# Patient Record
Sex: FEMALE | Race: WHITE | NOT HISPANIC OR LATINO | Employment: UNEMPLOYED | URBAN - METROPOLITAN AREA
[De-identification: names, ages, dates, MRNs, and addresses within clinical notes are randomized per-mention and may not be internally consistent; named-entity substitution may affect disease eponyms.]

---

## 2023-01-23 ENCOUNTER — OFFICE VISIT (OUTPATIENT)
Dept: FAMILY MEDICINE CLINIC | Facility: CLINIC | Age: 58
End: 2023-01-23

## 2023-01-23 VITALS
OXYGEN SATURATION: 99 % | HEIGHT: 62 IN | HEART RATE: 74 BPM | WEIGHT: 172.4 LBS | RESPIRATION RATE: 16 BRPM | SYSTOLIC BLOOD PRESSURE: 126 MMHG | TEMPERATURE: 97.3 F | BODY MASS INDEX: 31.73 KG/M2 | DIASTOLIC BLOOD PRESSURE: 80 MMHG

## 2023-01-23 DIAGNOSIS — M79.644 THUMB PAIN, RIGHT: Primary | ICD-10-CM

## 2023-01-23 DIAGNOSIS — Z12.31 ENCOUNTER FOR SCREENING MAMMOGRAM FOR BREAST CANCER: ICD-10-CM

## 2023-01-23 DIAGNOSIS — I10 HYPERTENSION, UNSPECIFIED TYPE: ICD-10-CM

## 2023-01-23 DIAGNOSIS — K21.9 GASTROESOPHAGEAL REFLUX DISEASE WITHOUT ESOPHAGITIS: ICD-10-CM

## 2023-01-23 DIAGNOSIS — Z12.4 SCREENING FOR CERVICAL CANCER: ICD-10-CM

## 2023-01-23 DIAGNOSIS — E03.9 HYPOTHYROIDISM, UNSPECIFIED TYPE: ICD-10-CM

## 2023-01-23 DIAGNOSIS — Z13.220 SCREENING FOR HYPERLIPIDEMIA: ICD-10-CM

## 2023-01-23 DIAGNOSIS — E66.9 OBESITY (BMI 30-39.9): ICD-10-CM

## 2023-01-23 RX ORDER — LYSINE 500 MG
TABLET ORAL DAILY
COMMUNITY

## 2023-01-23 RX ORDER — LEVOTHYROXINE SODIUM 112 MCG
112 TABLET ORAL DAILY
COMMUNITY
Start: 2022-12-12

## 2023-01-23 RX ORDER — FLUTICASONE PROPIONATE 50 MCG
1 SPRAY, SUSPENSION (ML) NASAL
COMMUNITY

## 2023-01-23 RX ORDER — LISINOPRIL 5 MG/1
2.5 TABLET ORAL
COMMUNITY
Start: 2023-01-11

## 2023-01-23 RX ORDER — ALPRAZOLAM 0.25 MG/1
TABLET ORAL
COMMUNITY

## 2023-01-23 RX ORDER — ECHINACEA PURPUREA EXTRACT 125 MG
1 TABLET ORAL
COMMUNITY
End: 2023-01-23

## 2023-01-23 NOTE — PROGRESS NOTES
Name: Bere Harris      : 1965      MRN: [id number]  Encounter Provider: Celina Shaffer DO  Encounter Date: 2023   Encounter department: [address] [place] [address] PRIMARY CARE  Chief Complaint   Patient presents with   • New Patient Visit     New patient being established pt is new to [facility],no previous records available  Pt states can't bend her right thumb for the past 3 mo  Pt is also having GI problems  Patient Instructions   Here for establishing care  Rec eating healthy and exercising  Recent stress test was wnl  Patient with right thumb pain and decreased ROM and will rec xray right hand  Rec updating labs for HTN and hypothyroidism  Rec new GYN as she moved to the area and is from [address] [place] [address] and is looking to establish with new doctors in area  Rec also to take BP med and recheck in 6 months for thyroid  Rec GI for gerd symptoms and may use prilosec or pepcid otc prn gerd  Assessment & Plan     1  Thumb pain, right  -     XR hand 3+ vw right; Future; Expected date: 2023  -     Ambulatory Referral to Orthopedic Surgery; Future    2  Hypothyroidism, unspecified type  -     TSH, 3rd generation; Future  -     T4, free    3  Hypertension, unspecified type  -     Comprehensive metabolic panel; Future    4  Gastroesophageal reflux disease without esophagitis  -     CBC and differential; Future  -     Ambulatory Referral to Gastroenterology; Future    5  Obesity (BMI 30-39 9)    6  Screening for cervical cancer  -     Ambulatory referral to Obstetrics / Gynecology; Future    7  Encounter for screening mammogram for breast cancer  -     Mammo screening bilateral w 3d & cad; Future; Expected date: 2023    8  Screening for hyperlipidemia  -     Comprehensive metabolic panel; Future  -     Lipid Panel with Direct LDL reflex; Future           Subjective      New Patient Visit (New patient being established pt is new to [facility],no previous records available   Pt states can't bend her right thumb for the past 3 mo  Pt is also having GI problems  ) Colon screening age 47 and had a hx of osteopenia and is on vitamin D for osteopenia and also has hx of HTN and thyroid d/o  Had covid 2 weeks ago  Covid Vaccinated  Patient does exercise and does not eat healthy  Mammograms are all UTD  Patient sees GYN in [place]  Patient has right thumb issues  No trauma to right thumb and has swelling in right thumb  Takes prilosec for gerd and would like to see GI  Colon screening is UTD  Retired teacher [employer]  Review of Systems   Constitutional: Negative  HENT: Negative  Eyes: Negative  Respiratory: Negative  Cardiovascular: Negative  Gastrointestinal:        Gevena Jamilah    Endocrine: Negative  Genitourinary: Negative  Musculoskeletal: Negative  Skin: Negative  Allergic/Immunologic: Negative  Neurological: Negative  Hematological: Negative  Psychiatric/Behavioral: Negative          Current Outpatient Medications on File Prior to Visit   Medication Sig   • ALPRAZolam (XANAX) 0 25 mg tablet Take by mouth daily at bedtime as needed for anxiety   • Calcium-Vitamins C & D (CALCIUM/C/D PO) Take by mouth   • Cholecalciferol 50 MCG (2000 UT) CAPS Take by mouth   • fluticasone (FLONASE) 50 mcg/act nasal spray 1 spray into each nostril   • L-Lysine 500 MG TABS Take by mouth daily   • lisinopril (ZESTRIL) 5 mg tablet 2 5 mg   • Multiple Vitamins-Minerals (MULTIVITAL PO) Take by mouth   • Probiotic Product (PROBIOTIC-10 PO) Take by mouth   • Synthroid 112 MCG tablet Take 112 mcg by mouth daily   • [DISCONTINUED] sodium chloride (OCEAN) 0 65 % nasal spray 1 spray into each nostril (Patient not taking: Reported on 1/23/2023)       Objective     /80 (BP Location: Left arm, Patient Position: Sitting, Cuff Size: Adult)   Pulse 74   Temp (!) 97 3 °F (36 3 °C) (Temporal)   Resp 16   Ht 5' 1 9" (1 572 m)   Wt 78 2 kg (172 lb 6 4 oz)   SpO2 99%   BMI 31 63 kg/m²     Physical Exam  Constitutional:       Appearance: She is well-developed  She is obese  HENT:      Head: Normocephalic and atraumatic  Right Ear: External ear normal       Left Ear: External ear normal       Nose: Nose normal    Eyes:      Conjunctiva/sclera: Conjunctivae normal       Pupils: Pupils are equal, round, and reactive to light  Cardiovascular:      Rate and Rhythm: Normal rate and regular rhythm  Pulses: Normal pulses  Heart sounds: Normal heart sounds  Pulmonary:      Effort: Pulmonary effort is normal       Breath sounds: Normal breath sounds  Abdominal:      General: Abdomen is flat  Bowel sounds are normal       Palpations: Abdomen is soft  Musculoskeletal:         General: Normal range of motion  Cervical back: Normal range of motion and neck supple  Skin:     General: Skin is warm and dry  Capillary Refill: Capillary refill takes less than 2 seconds  Neurological:      General: No focal deficit present  Mental Status: She is alert and oriented to person, place, and time  Deep Tendon Reflexes: Reflexes are normal and symmetric  Psychiatric:         Mood and Affect: Mood normal          Behavior: Behavior normal          Thought Content:  Thought content normal          Judgment: Judgment normal        Matt Pool DO

## 2023-01-23 NOTE — PATIENT INSTRUCTIONS
Here for establishing care  Rec eating healthy and exercising  Recent stress test was wnl  Patient with right thumb pain and decreased ROM and will rec xray right hand  Rec updating labs for HTN and hypothyroidism  Rec new GYN as she moved to the area and is from [place] and is looking to establish with new doctors in area  Rec also to take BP med and recheck in 6 months for thyroid  Rec GI for gerd symptoms and may use prilosec or pepcid otc prn gerd

## 2023-02-20 ENCOUNTER — OFFICE VISIT (OUTPATIENT)
Dept: FAMILY MEDICINE CLINIC | Facility: CLINIC | Age: 58
End: 2023-02-20

## 2023-02-20 VITALS
HEIGHT: 63 IN | TEMPERATURE: 96 F | WEIGHT: 171 LBS | BODY MASS INDEX: 30.3 KG/M2 | SYSTOLIC BLOOD PRESSURE: 124 MMHG | DIASTOLIC BLOOD PRESSURE: 86 MMHG | HEART RATE: 87 BPM | OXYGEN SATURATION: 99 %

## 2023-02-20 DIAGNOSIS — I10 HYPERTENSION, UNSPECIFIED TYPE: ICD-10-CM

## 2023-02-20 DIAGNOSIS — R30.0 DYSURIA: ICD-10-CM

## 2023-02-20 DIAGNOSIS — B37.31 VAGINAL CANDIDIASIS: ICD-10-CM

## 2023-02-20 DIAGNOSIS — J02.9 SORE THROAT: Primary | ICD-10-CM

## 2023-02-20 LAB
SL AMB  POCT GLUCOSE, UA: ABNORMAL
SL AMB LEUKOCYTE ESTERASE,UA: ABNORMAL
SL AMB POCT BILIRUBIN,UA: ABNORMAL
SL AMB POCT BLOOD,UA: ABNORMAL
SL AMB POCT CLARITY,UA: CLEAR
SL AMB POCT COLOR,UA: YELLOW
SL AMB POCT KETONES,UA: ABNORMAL
SL AMB POCT NITRITE,UA: ABNORMAL
SL AMB POCT PH,UA: 5
SL AMB POCT SPECIFIC GRAVITY,UA: 1
SL AMB POCT URINE PROTEIN: ABNORMAL
SL AMB POCT UROBILINOGEN: ABNORMAL

## 2023-02-20 RX ORDER — FLUCONAZOLE 150 MG/1
150 TABLET ORAL ONCE
Qty: 1 TABLET | Refills: 0 | Status: SHIPPED | OUTPATIENT
Start: 2023-02-20 | End: 2023-02-20

## 2023-02-20 RX ORDER — AZITHROMYCIN 250 MG/1
TABLET, FILM COATED ORAL
Qty: 6 TABLET | Refills: 0 | Status: SHIPPED | OUTPATIENT
Start: 2023-02-20 | End: 2023-02-25

## 2023-02-20 NOTE — PROGRESS NOTES
Name: Cherelle Neville      : 1965      MRN: [id number]  Encounter Provider: Lanny Cotton DO  Encounter Date: 2023   Encounter department: [address] [place] [address]  Chief Complaint   Patient presents with   • Cold Like Symptoms     Possible strep  Sore throat, white spots in back of throat, low grade temp  Burning when urinating  symptoms started Jus night   Neg home covid      Patient Instructions   Here for sore throat and dysuria, await UA c&s and take Diflucan if needed for vaginal candidiasis  Stay well hydrated and change toothbrush as directed  Assessment & Plan     1  Sore throat  -     azithromycin (Zithromax) 250 mg tablet; Take 2 tablets (500 mg total) by mouth daily for 1 day, THEN 1 tablet (250 mg total) daily for 4 days  2  Dysuria  -     POCT urine dip  -     azithromycin (Zithromax) 250 mg tablet; Take 2 tablets (500 mg total) by mouth daily for 1 day, THEN 1 tablet (250 mg total) daily for 4 days  3  Hypertension, unspecified type    4  Vaginal candidiasis  -     fluconazole (DIFLUCAN) 150 mg tablet; Take 1 tablet (150 mg total) by mouth once for 1 dose           Subjective      Cold Like Symptoms (Possible strep  Sore throat, white spots in back of throat, low grade temp  Burning when urinating  symptoms started Jus night   Neg home covid )      Covid test negative this am      Review of Systems   Constitutional: Negative  HENT: Positive for sore throat  Eyes: Negative  Respiratory: Negative  Cardiovascular: Negative  Gastrointestinal: Negative  Endocrine: Negative  Genitourinary: Positive for dysuria  Musculoskeletal: Negative  Skin: Negative  Allergic/Immunologic: Negative  Neurological: Negative  Hematological: Negative  Psychiatric/Behavioral: Negative          Current Outpatient Medications on File Prior to Visit   Medication Sig   • ALPRAZolam (XANAX) 0 25 mg tablet Take by mouth daily at bedtime as needed for anxiety   • Calcium-Vitamins C & D (CALCIUM/C/D PO) Take by mouth   • Cholecalciferol 50 MCG (2000 UT) CAPS Take by mouth   • fluticasone (FLONASE) 50 mcg/act nasal spray 1 spray into each nostril   • L-Lysine 500 MG TABS Take by mouth daily   • lisinopril (ZESTRIL) 5 mg tablet 2 5 mg   • Multiple Vitamins-Minerals (MULTIVITAL PO) Take by mouth   • Probiotic Product (PROBIOTIC-10 PO) Take by mouth   • Synthroid 112 MCG tablet Take 112 mcg by mouth daily       Objective     /86 (BP Location: Left arm, Patient Position: Sitting, Cuff Size: Large)   Pulse 87   Temp (!) 96 °F (35 6 °C) (Temporal)   Ht 5' 2 5" (1 588 m)   Wt 77 6 kg (171 lb)   LMP  (Exact Date)   SpO2 99%   BMI 30 78 kg/m²     Physical Exam  Constitutional:       Appearance: Normal appearance  She is well-developed  HENT:      Head: Normocephalic and atraumatic  Right Ear: Tympanic membrane, ear canal and external ear normal       Left Ear: Tympanic membrane, ear canal and external ear normal       Nose: Nose normal       Mouth/Throat:      Mouth: Mucous membranes are moist       Comments: Hurts to swallow and has white spots in back of throat  Eyes:      Conjunctiva/sclera: Conjunctivae normal       Pupils: Pupils are equal, round, and reactive to light  Cardiovascular:      Rate and Rhythm: Normal rate and regular rhythm  Pulses: Normal pulses  Heart sounds: Normal heart sounds  Pulmonary:      Effort: Pulmonary effort is normal       Breath sounds: Normal breath sounds  Musculoskeletal:         General: Normal range of motion  Cervical back: Normal range of motion and neck supple  Skin:     General: Skin is warm and dry  Neurological:      Mental Status: She is alert and oriented to person, place, and time  Deep Tendon Reflexes: Reflexes are normal and symmetric     Psychiatric:         Behavior: Behavior normal        Lonzie Roles, DO

## 2023-02-22 LAB — BACTERIA UR CULT: NORMAL

## 2023-03-24 ENCOUNTER — OFFICE VISIT (OUTPATIENT)
Dept: FAMILY MEDICINE CLINIC | Facility: CLINIC | Age: 58
End: 2023-03-24

## 2023-03-24 VITALS
BODY MASS INDEX: 31.47 KG/M2 | HEART RATE: 100 BPM | TEMPERATURE: 96.2 F | WEIGHT: 171 LBS | DIASTOLIC BLOOD PRESSURE: 68 MMHG | OXYGEN SATURATION: 99 % | SYSTOLIC BLOOD PRESSURE: 108 MMHG | HEIGHT: 62 IN

## 2023-03-24 DIAGNOSIS — G57.62 MORTON'S NEUROMA OF LEFT FOOT: ICD-10-CM

## 2023-03-24 DIAGNOSIS — M54.41 CHRONIC MIDLINE LOW BACK PAIN WITH RIGHT-SIDED SCIATICA: Primary | ICD-10-CM

## 2023-03-24 DIAGNOSIS — G89.29 CHRONIC MIDLINE LOW BACK PAIN WITH RIGHT-SIDED SCIATICA: Primary | ICD-10-CM

## 2023-03-24 RX ORDER — METHOCARBAMOL 500 MG/1
500 TABLET, FILM COATED ORAL 3 TIMES DAILY
Qty: 15 TABLET | Refills: 0 | Status: SHIPPED | OUTPATIENT
Start: 2023-03-24

## 2023-03-24 RX ORDER — OXYCODONE HYDROCHLORIDE 5 MG/1
TABLET ORAL
COMMUNITY
Start: 2023-02-24 | End: 2023-03-24

## 2023-03-24 RX ORDER — METHYLPREDNISOLONE 4 MG/1
TABLET ORAL
Qty: 21 EACH | Refills: 0 | Status: SHIPPED | OUTPATIENT
Start: 2023-03-24

## 2023-03-24 NOTE — PATIENT INSTRUCTIONS
Start steroid, this is the Medrol  This is 24 mg on day 1, then decrease by one tablet (4 mg) each day until course completed  Take this with food as it may upset your stomach  It's best to take it earlier in the day otherwise it may keep you up at night  Do not take this with NSAIDs such as advil/ibuprofen/advil/aleve/motrin  Start robaxin, this is one pill three times a day as needed  This medication may cause drowsiness  Do not drive on this medication  Do not drink alcohol while taking this medication  Do not mix with other medications that may cause drowsiness  Referral to comprehensive spine program given  Please call the office if you are experiencing any worsening of symptoms or no symptom improvement  Dermatology Practices     [facility]   Dr Josh Mckeon MD and Nancie Connors Office: [address] [place] Office: [phone]  [facility]   Multiple locations and providers  [phone]  [facility]   Multiple Providers and Locations  [address]   [phone]  [facility]   [address] [place], [address]  You can also call the back of your insurance card and ask them for covered providers

## 2023-03-24 NOTE — PROGRESS NOTES
Name: Samuel Bradley      : 1965      MRN: [id number]  Encounter Provider: AFUA Monaco  Encounter Date: 3/24/2023   Encounter department: [address] [place] [address] Road     1  Chronic midline low back pain with right-sided sciatica  -     Ambulatory Referral to Comprehensive Spine Program; Future  -     methylPREDNISolone 4 MG tablet therapy pack; Use as directed on package  -     methocarbamol (ROBAXIN) 500 mg tablet; Take 1 tablet (500 mg total) by mouth 3 (three) times a day    2  Rust's neuroma of left foot  -     Ambulatory Referral to Podiatry; Future    Discussed differentials and treatment options with patient at length  Advised patient that anti-inflammatories and muscle relaxant as well as physical therapy are likely for steps  Imaging typically not done unless traumatic injury or cannot tolerate physical therapy/neurological symptoms  Current agreed-upon plan would be starting Robaxin the muscle relaxant 3 times a day as needed  Patient advised that this could cause drowsiness  Discussed use of Advil versus trying a different steroid  Patient prefers to try different steroid  Did get joint pain from prednisone and she will monitor for this closely with Medrol  Medrol Dosepak sent to pharmacy for her to start  Can use Tylenol if needed  Referral provided to comprehensive spine program for evaluation where they will guide her to the next best step  Right leg pain may be secondary to chronic ongoing back pain/ compensation  Handout given on low back exercises for her to start  Advised to avoid any heavy lifting pushing pulling/twisting  Information on dermatology practices in the area given to patient and referral to podiatry provided as well  All questions answered  Patient to call if any worsening symptoms or no improvement  Subjective      Patient presents with back pain and leg pain    Patient states this started a few months ago where she did pull something in her lower back during yoga  Since then she has been trying to manage the symptoms at home  She has tried over-the-counter medications such as Advil which does help but this does aggravate her stomach as well  She has been seeing a chiropractor without any relief  She is a massage therapy as well  She states the pain is present in her very low back  She does get pain into her right leg as well  She states this leg is more of a tightness soreness feeling  Sometimes the pain is behind her right knee  No swelling redness or warmth that she has noticed on her legs or back  She states that after sitting for long period of time she has pain and trouble putting full weight on it  There is no traumatic injury or event that caused this  He is also requesting information for a podiatrist as she needs new shoe inserts/orthotics which ultimately help with her back pain and history of Rust's neuroma  Also requesting dermatology pronation  Review of Systems   Constitutional: Negative for chills and fever  Eyes: Negative for discharge  Respiratory: Negative for shortness of breath  Cardiovascular: Negative for chest pain  Gastrointestinal: Negative for constipation and diarrhea  Genitourinary: Negative for difficulty urinating  Musculoskeletal: Positive for back pain  Negative for joint swelling  Skin: Negative for rash  Neurological: Negative for headaches  Hematological: Negative for adenopathy  Psychiatric/Behavioral: The patient is not nervous/anxious          Current Outpatient Medications on File Prior to Visit   Medication Sig   • ALPRAZolam (XANAX) 0 25 mg tablet Take by mouth daily at bedtime as needed for anxiety   • Calcium-Vitamins C & D (CALCIUM/C/D PO) Take by mouth   • Cholecalciferol 50 MCG (2000 UT) CAPS Take by mouth   • fluticasone (FLONASE) 50 mcg/act nasal spray 1 spray into each nostril   • L-Lysine 500 MG TABS Take by mouth daily • lisinopril (ZESTRIL) 5 mg tablet 2 5 mg   • Multiple Vitamins-Minerals (MULTIVITAL PO) Take by mouth   • Probiotic Product (PROBIOTIC-10 PO) Take by mouth   • Synthroid 112 MCG tablet Take 112 mcg by mouth daily   • [DISCONTINUED] oxyCODONE (ROXICODONE) 5 immediate release tablet TAKE 1 TABLET BY MOUTH EVERY 4 HOURS AS NEEDED FOR POSTOPERATIVE PAIN MAX 6 TABLET DAILY (Patient not taking: Reported on 3/24/2023)       Objective     /68 (BP Location: Left arm, Patient Position: Sitting, Cuff Size: Large)   Pulse 100   Temp (!) 96 2 °F (35 7 °C) (Temporal)   Ht 5' 2" (1 575 m)   Wt 77 6 kg (171 lb)   SpO2 99%   BMI 31 28 kg/m²     Physical Exam  Vitals and nursing note reviewed  Constitutional:       General: She is not in acute distress  Appearance: She is well-developed  She is obese  She is not diaphoretic  HENT:      Head: Normocephalic and atraumatic  Right Ear: External ear normal       Left Ear: External ear normal    Eyes:      General: Lids are normal          Right eye: No discharge  Left eye: No discharge  Conjunctiva/sclera: Conjunctivae normal    Cardiovascular:      Rate and Rhythm: Normal rate and regular rhythm  Heart sounds: No murmur heard  Pulmonary:      Effort: Pulmonary effort is normal  No respiratory distress  Breath sounds: Normal breath sounds  No wheezing  Musculoskeletal:         General: No deformity  Cervical back: Neck supple  No tenderness or bony tenderness  Thoracic back: No tenderness or bony tenderness  Lumbar back: Tenderness present  No edema, signs of trauma or bony tenderness  Normal range of motion  Negative right straight leg raise test and negative left straight leg raise test       Comments: Pain with prolonged sitting and some twisting movements  Skin:     General: Skin is warm and dry  Neurological:      Mental Status: She is alert and oriented to person, place, and time     Psychiatric: Speech: Speech normal          Behavior: Behavior normal          Thought Content:  Thought content normal          Judgment: Judgment normal        AFUA Foster

## 2023-04-14 LAB — HBA1C MFR BLD HPLC: 5.3 %

## 2023-07-06 NOTE — PATIENT INSTRUCTIONS
Here for sore throat and dysuria, await UA c&s and take Diflucan if needed for vaginal candidiasis  Stay well hydrated and change toothbrush as directed  Strong peripheral pulses

## 2023-07-10 ENCOUNTER — APPOINTMENT (OUTPATIENT)
Dept: RADIOLOGY | Facility: CLINIC | Age: 58
End: 2023-07-10
Payer: COMMERCIAL

## 2023-07-10 DIAGNOSIS — M25.561 ARTHRALGIA OF RIGHT LOWER LEG: ICD-10-CM

## 2023-07-10 PROCEDURE — 73562 X-RAY EXAM OF KNEE 3: CPT

## 2023-07-11 ENCOUNTER — HOSPITAL ENCOUNTER (OUTPATIENT)
Dept: NON INVASIVE DIAGNOSTICS | Facility: HOSPITAL | Age: 58
Discharge: HOME | End: 2023-07-11
Payer: COMMERCIAL

## 2023-07-11 DIAGNOSIS — M79.604 PAIN IN RIGHT LEG: ICD-10-CM

## 2023-07-11 PROCEDURE — 93971 EXTREMITY STUDY: CPT

## 2023-07-11 PROCEDURE — 93971 EXTREMITY STUDY: CPT | Performed by: SURGERY

## 2023-07-19 ENCOUNTER — TELEPHONE (OUTPATIENT)
Dept: ADMINISTRATIVE | Facility: OTHER | Age: 58
End: 2023-07-19

## 2023-07-19 ENCOUNTER — OFFICE VISIT (OUTPATIENT)
Dept: FAMILY MEDICINE CLINIC | Facility: CLINIC | Age: 58
End: 2023-07-19
Payer: COMMERCIAL

## 2023-07-19 VITALS
SYSTOLIC BLOOD PRESSURE: 132 MMHG | HEART RATE: 91 BPM | OXYGEN SATURATION: 98 % | WEIGHT: 173.6 LBS | HEIGHT: 62 IN | BODY MASS INDEX: 31.94 KG/M2 | DIASTOLIC BLOOD PRESSURE: 73 MMHG | TEMPERATURE: 98.2 F

## 2023-07-19 DIAGNOSIS — E78.2 MIXED HYPERLIPIDEMIA: ICD-10-CM

## 2023-07-19 DIAGNOSIS — M54.50 CHRONIC RIGHT-SIDED LOW BACK PAIN WITHOUT SCIATICA: Primary | ICD-10-CM

## 2023-07-19 DIAGNOSIS — M79.605 LEFT LEG PAIN: ICD-10-CM

## 2023-07-19 DIAGNOSIS — G89.29 CHRONIC RIGHT-SIDED LOW BACK PAIN WITHOUT SCIATICA: Primary | ICD-10-CM

## 2023-07-19 DIAGNOSIS — E03.9 HYPOTHYROIDISM, UNSPECIFIED TYPE: ICD-10-CM

## 2023-07-19 PROCEDURE — 99214 OFFICE O/P EST MOD 30 MIN: CPT | Performed by: FAMILY MEDICINE

## 2023-07-19 RX ORDER — VALACYCLOVIR HYDROCHLORIDE 1 G/1
TABLET, FILM COATED ORAL
COMMUNITY
Start: 2023-04-13

## 2023-07-19 RX ORDER — ESTRADIOL 0.1 MG/G
CREAM VAGINAL
COMMUNITY
Start: 2023-07-03

## 2023-07-19 RX ORDER — LEVOTHYROXINE SODIUM 100 MCG
100 TABLET ORAL DAILY
COMMUNITY
Start: 2023-06-22

## 2023-07-19 RX ORDER — ALPRAZOLAM 0.5 MG/1
TABLET ORAL
COMMUNITY
Start: 2023-04-14

## 2023-07-19 NOTE — TELEPHONE ENCOUNTER
Upon review of the In Basket request we were able to locate, review, and update the patient chart as requested for Mammogram.    Any additional questions or concerns should be emailed to the Practice Liaisons via the appropriate education email address, please do not reply via In Basket.     Thank you  Siobhan Chan

## 2023-07-19 NOTE — TELEPHONE ENCOUNTER
Upon review of the In Basket request and the patient's chart, initial outreach has been made via telephone call to facility. Please see Contacts section for details.      Thank you  Esa Cobian

## 2023-07-19 NOTE — TELEPHONE ENCOUNTER
----- Message from Sandra Sawyer MA sent at 7/19/2023  9:00 AM EDT -----  Regarding: Care Gap Request  07/19/23 9:00 AM    Hello, our patient Thao Ortiz has had Mammogram completed/performed. Please assist in updating the patient chart by pulling the Care Everywhere (CE) document. The date of service is 6/27/22.      Thank you,  Sandra Sawyer  [facility]

## 2023-07-19 NOTE — PROGRESS NOTES
Name: Luis A Agudelo      : 1965      MRN: [id number]  Encounter Provider: Madhu Clayton DO  Encounter Date: 2023   Encounter department: [address] [place] [address]     1. Chronic right-sided low back pain without sciatica  -     Ambulatory referral to Physical Therapy; Future    2. Left leg pain  -     Ambulatory referral to Physical Therapy; Future    3. Hypothyroidism, unspecified type  -     TSH, 3rd generation; Future; Expected date: 2024    4. Mixed hyperlipidemia  -     Comprehensive metabolic panel; Future; Expected date: 2024  -     Lipid Panel with Direct LDL reflex; Future; Expected date: 2024       Reviewed past medical history. Reviewed recent blood work. Lipids elevated but patient would like to try diet for at least 6 months before considering medication. We will recheck in January. Will refer to PT for follow-up on her low back and right leg pain. She does have some recurrent respiratory symptoms which seem to occur [place] outdoors [place] the beach. We will have her use antihistamines preemptively and follow-up if symptoms persist.      Subjective     Patient presents to establish care. We reviewed her chronic medical problems. She is being treated for hypothyroidism and hypertension. She had a right hand surgery several months ago and is healing well. She complains of some low back and right leg pain. She also has some recurrent respiratory symptoms which seem to occur when she is outdoors [place] the sure. She had recent blood work done by her previous primary care physician in [facility]. Review of Systems   Constitutional: Negative. Respiratory: Negative. Cardiovascular: Negative. Gastrointestinal: Negative. Genitourinary: Negative. Musculoskeletal: Negative. Psychiatric/Behavioral: Negative.         Past Medical History:   Diagnosis Date   • Hypothyroidism    • Rosacea      Past Surgical History:   Procedure Laterality Date   • BLADDER SURGERY  2018   • TRIGGER FINGER RELEASE Right 02/27/2023     Family History   Problem Relation Age of Onset   • Hypothyroidism Mother    • Anemia Mother    • Lung cancer Mother    • Hypertension Mother    • Diabetes Brother    • Emphysema Brother    • Pancreatitis Brother      Social History     Socioeconomic History   • Marital status: /Civil Union     Spouse name: None   • Number of children: None   • Years of education: None   • Highest education level: None   Occupational History   • None   Tobacco Use   • Smoking status: Never     Passive exposure: Never   • Smokeless tobacco: Never   Vaping Use   • Vaping Use: Never used   Substance and Sexual Activity   • Alcohol use: Not Currently   • Drug use: Never   • Sexual activity: None   Other Topics Concern   • None   Social History Narrative   • None     Social Determinants of Health     Financial Resource Strain: Not on file   Food Insecurity: Not on file   Transportation Needs: Not on file   Physical Activity: Not on file   Stress: Not on file   Social Connections: Not on file   Intimate Partner Violence: Not on file   Housing Stability: Not on file     Current Outpatient Medications on File Prior to Visit   Medication Sig   • ALPRAZolam (XANAX) 0.5 mg tablet TAKE HALF TABLET BY MOUTH 2 TIMES A DAY AS NEEDED FOR ANXIETY   • Calcium-Vitamins C & D (CALCIUM/C/D PO) Take by mouth   • Cholecalciferol 50 MCG (2000 UT) CAPS Take by mouth   • estradiol (ESTRACE VAGINAL) 0.1 mg/g vaginal cream Twice weekly   • fluticasone (FLONASE) 50 mcg/act nasal spray 1 spray into each nostril   • L-Lysine 500 MG TABS Take by mouth daily   • lisinopril (ZESTRIL) 5 mg tablet 2.5 mg   • Multiple Vitamins-Minerals (MULTIVITAL PO) Take by mouth   • Synthroid 100 MCG tablet Take 100 mcg by mouth daily   • valACYclovir (VALTREX) 1,000 mg tablet TAKE 2 TABLETS BY MOUTH AND REPEAT DOSE IN 12 HOURS   • [DISCONTINUED] ALPRAZolam (XANAX) 0.25 mg tablet Take by mouth daily at bedtime as needed for anxiety   • [DISCONTINUED] methocarbamol (ROBAXIN) 500 mg tablet Take 1 tablet (500 mg total) by mouth 3 (three) times a day   • [DISCONTINUED] methylPREDNISolone 4 MG tablet therapy pack Use as directed on package   • [DISCONTINUED] Probiotic Product (PROBIOTIC-10 PO) Take by mouth   • [DISCONTINUED] Synthroid 112 MCG tablet Take 112 mcg by mouth daily     Allergies   Allergen Reactions   • Minocycline Other (See Comments)   • Prednisone Other (See Comments)     Joint Aches      • Tetracyclines & Related Other (See Comments)     Joint aches      Immunization History   Administered Date(s) Administered   • INFLUENZA 11/17/2022       Objective     /73 (BP Location: Left arm, Patient Position: Sitting, Cuff Size: Adult)   Pulse 91   Temp 98.2 °F (36.8 °C)   Ht 5' 2.21" (1.58 m)   Wt 78.7 kg (173 lb 9.6 oz)   SpO2 98%   BMI 31.54 kg/m²     Physical Exam  Vitals and nursing note reviewed. Constitutional:       General: She is not in acute distress. Appearance: She is well-developed. She is not diaphoretic. HENT:      Head: Normocephalic and atraumatic. Eyes:      General:         Right eye: No discharge. Conjunctiva/sclera: Conjunctivae normal.      Pupils: Pupils are equal, round, and reactive to light. Neck:      Thyroid: No thyromegaly. Cardiovascular:      Rate and Rhythm: Normal rate and regular rhythm. Pulmonary:      Effort: Pulmonary effort is normal. No respiratory distress. Breath sounds: Normal breath sounds. Musculoskeletal:      Cervical back: Normal range of motion. Lymphadenopathy:      Cervical: No cervical adenopathy. Skin:     General: Skin is warm and dry. Neurological:      Mental Status: She is alert and oriented to person, place, and time. Psychiatric:         Behavior: Behavior normal.         Thought Content:  Thought content normal.         Judgment: Judgment normal.       Lexie Colón DO

## 2023-07-19 NOTE — TELEPHONE ENCOUNTER
----- Message from Shadi Ruffin MA sent at 7/19/2023  9:02 AM EDT -----  Regarding: Care Gap Request  07/19/23 9:02 AM    Hello, our patient Luis A Agudelo has had CRC: Colonoscopy completed/performed. Please assist in updating the patient chart by making an External outreach to Dr. Vernon Blair ([address] [place] [address]) facility located in [facility] [employer]. The date of service is 2015.  [phone].     Thank you,  Shadi Ruffin  [facility]

## 2023-07-24 NOTE — TELEPHONE ENCOUNTER
Upon review of the In Basket request we were able to locate, review, and update the patient chart as requested for CRC: Colonoscopy. Any additional questions or concerns should be emailed to the Practice Liaisons via the appropriate education email address, please do not reply via In Basket.     Thank you  Vernon Tafoya

## 2023-07-25 ENCOUNTER — EVALUATION (OUTPATIENT)
Dept: PHYSICAL THERAPY | Facility: CLINIC | Age: 58
End: 2023-07-25
Payer: COMMERCIAL

## 2023-07-25 DIAGNOSIS — M54.50 CHRONIC RIGHT-SIDED LOW BACK PAIN WITHOUT SCIATICA: ICD-10-CM

## 2023-07-25 DIAGNOSIS — G89.29 CHRONIC RIGHT-SIDED LOW BACK PAIN WITHOUT SCIATICA: ICD-10-CM

## 2023-07-25 DIAGNOSIS — M79.605 LEFT LEG PAIN: ICD-10-CM

## 2023-07-25 PROCEDURE — 97161 PT EVAL LOW COMPLEX 20 MIN: CPT | Performed by: PHYSICAL THERAPIST

## 2023-07-25 PROCEDURE — 97110 THERAPEUTIC EXERCISES: CPT | Performed by: PHYSICAL THERAPIST

## 2023-07-25 NOTE — PROGRESS NOTES
PT Evaluation     Today's date: 2023  Patient name: Princess Rayo  : 1965  MRN: [id number]  Referring provider: Leslie Bender DO  Dx:   Encounter Diagnosis     ICD-10-CM    1. Chronic right-sided low back pain without sciatica  M54.50 Ambulatory referral to Physical Therapy    G89.29       2. Left leg pain  M79.605 Ambulatory referral to Physical Therapy                     Assessment  Assessment details: Princess Rayo is a 60 y.o. female referred to outpatient physical therapy for right sided leg pain. Impairments include pain and decreased RLE strength. Limitations include prolonged flexion, prolonged extension, descending stairs, and walking, limiting patient from playing with grandchildren and walking for enjoyment. Assessment reveals possible meniscus involvement, due to positive medial and lateral Jenny, pain with maximum passive knee flexion,  history of mechanical locking, and TTP of medial joint line. Assessment also reveals R sciatic neural tension due to positive passive SLR. Patient would benefit from skilled PT to strengthen RLE and decrease right sciatic nerve tension in order for patient to return to prior level of function. Impairments: abnormal gait, activity intolerance, impaired physical strength, lacks appropriate home exercise program and pain with function    Goals  Short term goals (2-3 weeks):  1. Pt will demonstrate an understanding in HEP. 2. Increase RLE strength by 1/3 in all deficit planes. 3. Negative passive SLR of RLE. Long term goals (4-8 weeks):  1. Pt will meet or exceed expected FOTO outcomes. 2. Pt will report ability to play with grandchildren with minimal pain (2/10). 3. Walk >30 minutes with minimal pain (2/10). 4. Descend steps with minimal pain (2/10).        Plan  Patient would benefit from: skilled physical therapy  Planned modality interventions: low level laser therapy, TENS, thermotherapy: hydrocollator packs, traction, unattended electrical stimulation and cryotherapy  Planned therapy interventions: activity modification, ADL retraining, balance, balance/weight bearing training, body mechanics training, flexibility, stretching, functional ROM exercises, home exercise program, therapeutic exercise, therapeutic activities, strengthening, patient education, neuromuscular re-education, behavior modification, massage, manual therapy and joint mobilization  Frequency: 2x week  Duration in weeks: 8        Subjective Evaluation    History of Present Illness  Mechanism of injury: Patient presents to outpatient physical therapy with chief complaint of right leg pain. Patient reports sudden that began end of February / beginning of March while doing yoga. Patient Denies episodes of numbness or tingling in RLE. Pain described as aching and fatigued in R leg. Pain rating currently 1/10, at best 0/10 and at worst 7/10. Patient states limitations with prolonged knee flexion and extension and then changing of positions after the prolonged position. Pt also states limitations with descending stairs and walking (longer than 20 minutes). Aggravating factors include going down the stairs, walking (longer than 20 mins), and rising after sitting. Patient states use of swimming and advil for relief of symptoms. Pt goals for therapy include to decrease R knee pain and decrease R leg fatigue.    Patient Goals  Patient goals for therapy: decreased pain, increased strength, independence with ADLs/IADLs and return to sport/leisure activities    Pain  Current pain ratin  At best pain ratin  At worst pain ratin  Quality: dull ache  Relieving factors: medications  Aggravating factors: walking      Diagnostic Tests    FCE comments: 7/10/23: X ray R knee per chart "FINDINGS:     There is no acute fracture or dislocation.     There is no joint effusion.     Mild osteoarthritis with narrowing of the medial tibiofemoral and patellofemoral joints and small osteophytes seen.     No lytic or blastic osseous lesion.     Soft tissues are unremarkable."      Objective     Concurrent Complaints  Negative for bladder dysfunction, bowel dysfunction and saddle (S4) numbness    Tenderness     Right Knee   Tenderness in the MCL (distal), MCL (proximal), medial joint line and medial patella. No tenderness in the inferior patella, lateral joint line, lateral patella, LCL (distal), LCL (proximal), patellar tendon, quadriceps tendon and superior patella. Neurological Testing     Reflexes   Left   Patellar (L4): normal (2+)  Achilles (S1): normal (2+)    Right   Patellar (L4): normal (2+)  Achilles (S1): normal (2+)    Active Range of Motion     Lumbar   Flexion:  Restriction level: minimal  Extension:  Restriction level: minimal  Left lateral flexion:  Restriction level: minimal  Right lateral flexion:  Restriction level: minimal  Left Knee   Flexion: 135 degrees   Extension: 0 degrees     Right Knee   Flexion: 135 degrees with pain  Extension: 0 degrees with pain    Strength/Myotome Testing     Left Hip   Planes of Motion   Flexion: 4+    Right Hip   Planes of Motion   Flexion: 3 (Pain in R knee)  Abduction: 3+    Left Knee   Flexion: 4+  Extension: 4+  Quadriceps contraction: fair    Right Knee   Flexion: 4-  Extension: 4+  Quadriceps contraction: poor    Left Ankle/Foot   Dorsiflexion: 4+  Plantar flexion: 4+    Right Ankle/Foot   Dorsiflexion: 4+  Plantar flexion: 4+    Tests     Lumbar     Left   Negative passive SLR and slump test.     Right   Positive passive SLR. Negative slump test.     Right Hip   Negative WILLAM and FADIR. Right Knee   Positive lateral Jenny and medial Jenny. Negative anterior drawer, posterior drawer, valgus stress test at 0 degrees, valgus stress test at 30 degrees, varus stress test at 0 degrees and varus stress test at 30 degrees.               Precautions: HTN      Manuals 7/25            Sciatic nerve glides R Neuro Re-Ed             SL stance on foam R             Mini squats on bosu ball                                                                              Ther Ex             Bike             Supine sciatic nerve glides R HEP            SLR R HEP            VMO SLR R             [place] R             LAQ R             Standing hip abduction B/L             Multi hip machine (hip flex, hip ext, abduction) B/L             Leg press                                       Ther Activity             Eccentric sit to stands              Lateral step downs R             Step up and overs              Gait Training                                       Modalities

## 2023-08-02 ENCOUNTER — OFFICE VISIT (OUTPATIENT)
Dept: PHYSICAL THERAPY | Facility: CLINIC | Age: 58
End: 2023-08-02
Payer: COMMERCIAL

## 2023-08-02 DIAGNOSIS — G89.29 CHRONIC RIGHT-SIDED LOW BACK PAIN WITHOUT SCIATICA: Primary | ICD-10-CM

## 2023-08-02 DIAGNOSIS — M54.50 CHRONIC RIGHT-SIDED LOW BACK PAIN WITHOUT SCIATICA: Primary | ICD-10-CM

## 2023-08-02 DIAGNOSIS — M79.605 LEFT LEG PAIN: ICD-10-CM

## 2023-08-02 PROCEDURE — 97110 THERAPEUTIC EXERCISES: CPT | Performed by: PHYSICAL THERAPIST

## 2023-08-02 PROCEDURE — 97140 MANUAL THERAPY 1/> REGIONS: CPT | Performed by: PHYSICAL THERAPIST

## 2023-08-04 ENCOUNTER — OFFICE VISIT (OUTPATIENT)
Dept: PHYSICAL THERAPY | Facility: CLINIC | Age: 58
End: 2023-08-04
Payer: COMMERCIAL

## 2023-08-04 DIAGNOSIS — M79.605 LEFT LEG PAIN: ICD-10-CM

## 2023-08-04 DIAGNOSIS — M54.50 CHRONIC RIGHT-SIDED LOW BACK PAIN WITHOUT SCIATICA: Primary | ICD-10-CM

## 2023-08-04 DIAGNOSIS — G89.29 CHRONIC RIGHT-SIDED LOW BACK PAIN WITHOUT SCIATICA: Primary | ICD-10-CM

## 2023-08-04 PROCEDURE — 97110 THERAPEUTIC EXERCISES: CPT

## 2023-08-04 PROCEDURE — 97530 THERAPEUTIC ACTIVITIES: CPT

## 2023-08-04 PROCEDURE — 97140 MANUAL THERAPY 1/> REGIONS: CPT

## 2023-08-04 NOTE — PROGRESS NOTES
Daily Note     Today's date: 2023  Patient name: Regina Busch  : 1965  MRN: [id number]  Referring provider: Suly Mccray DO  Dx:   Encounter Diagnosis     ICD-10-CM    1. Chronic right-sided low back pain without sciatica  M54.50     G89.29       2. Left leg pain  M79.605                      Subjective: Patient reported seeing positive progress since start of PT however did experience an increase in leg pain during sleep last night, having to get up and perform HEP stretches. Does not recall if her leg was flexed or extended while sleeping. Did have to sit longer yesterday while at meeting later in the evening and wonders if this was the cause. Pleased she was able to walk about 20 min on even surface without limitations. Objective: See treatment diary below. Assessment: Femoral nerve tension remains over sciatic nerve tension. Hip abductor and glute med strengthening progressed while maintaining proper form but resulting muscle fatigue due to weakness. Post session she noted feeling generalized muscle soreness but denied onset of pain. Will continue to benefit from skilled PT to further improve neural tension and increase proximal and posterior chain weakness to further improve her daily function and increase walking tolerance. Plan: Continue per plan of care.            Precautions: HTN    Manuals           Sciatic nerve glides R  Assessed           STM R hamstring   RM The stick - EH                                    Neuro Re-Ed             SL stance on foam R             Mini squats on bosu ball                                                                              Ther Ex             Bike  5 min 5 min  L1          Supine sciatic nerve glides R HEP            SLR R HEP 2x10 2x10          Femoral nerve tensioner R  2x10  2x10          VMO SLR R  2x10 3x10          Clamshells R  3x10 OTB  3x10          LAQ R  3x10 2# 2#  3x10          Seated HS stretch 3x30" B/L 30"x3 bilat          Standing hip abduction B/L  [place] TB  2x10 ea.    Pink  2x10  bilat          Multi hip machine (hip flex, hip ext, abduction) B/L             Leg press - seat 7     SL  35#  2x10    45#  2x10                                    Ther Activity             Eccentric sit to stands   2x10 2x10          Lateral step downs R             Step up and overs                           Gait Training                                       Modalities

## 2023-08-08 ENCOUNTER — OFFICE VISIT (OUTPATIENT)
Dept: PHYSICAL THERAPY | Facility: CLINIC | Age: 58
End: 2023-08-08
Payer: COMMERCIAL

## 2023-08-08 DIAGNOSIS — G89.29 CHRONIC RIGHT-SIDED LOW BACK PAIN WITHOUT SCIATICA: Primary | ICD-10-CM

## 2023-08-08 DIAGNOSIS — M54.50 CHRONIC RIGHT-SIDED LOW BACK PAIN WITHOUT SCIATICA: Primary | ICD-10-CM

## 2023-08-08 DIAGNOSIS — M79.605 LEFT LEG PAIN: ICD-10-CM

## 2023-08-08 PROCEDURE — 97530 THERAPEUTIC ACTIVITIES: CPT | Performed by: PHYSICAL THERAPIST

## 2023-08-08 PROCEDURE — 97110 THERAPEUTIC EXERCISES: CPT | Performed by: PHYSICAL THERAPIST

## 2023-08-08 PROCEDURE — 97140 MANUAL THERAPY 1/> REGIONS: CPT | Performed by: PHYSICAL THERAPIST

## 2023-08-08 NOTE — PROGRESS NOTES
Daily Note     Today's date: 2023  Patient name: Regina Busch  : 1965  MRN: [id number]  Referring provider: Suly Mccray DO  Dx:   Encounter Diagnosis     ICD-10-CM    1. Chronic right-sided low back pain without sciatica  M54.50     G89.29       2. Left leg pain  M79.605                      Subjective: Pt reports that she was in a lot of pain last night; attributing it to cleaning her bathrooms and playing with her grandchildren for most of the day. Objective: See treatment diary below      Assessment: Tenderness of R distal hamstring was noted upon palpation, therefore STM was performed. Pt demonstrated moderate difficulty and mild quad lag while performing SLR and SLR VMO, indicating the exercise is currently an appropriate intensity. Pt demonstrated little difficulty or fatigue while performing LAQ, indicating the intensity of the exercise needs to be progressed next session. Passive SLR was negative, indicating the pt no longer has sciatic neural tension, therefore sciatic nerve glides were discontinued. Overall, the pt was appropriately fatigued post session. Plan: Continue per plan of care. Precautions: HTN    Manuals          Sciatic nerve glides R  Assessed  Assessed , DC         STM R hamstring   RM The stick - EH The stick -RM                                   Neuro Re-Ed             SL stance on foam R             Mini squats on bosu ball                                                                              Ther Ex             Bike  5 min 5 min  L1 5 min L1         Supine sciatic nerve glides R HEP            SLR R HEP 2x10 2x10 3x10          Femoral nerve tensioner R  2x10  2x10 3x10         VMO SLR R  2x10 3x10 3x10          Clamshells R  3x10 OTB  3x10 OTB 3x10          LAQ R  3x10 2# 2#  3x10 2# 3x10  3#        Seated HS stretch   3x30" B/L 30"x3 bilat 30"x3 bilat         Standing hip abduction B/L  [place] TB  2x10 ea.    Pink  2x10  bilat Pink 2x10 bilat          Multi hip machine (hip flex, hip ext, abduction) B/L             Leg press - seat 7     SL  35#  2x10    45#  2x10 SL 45# 3x10    SL 65# 2x10                                   Ther Activity             Eccentric sit to stands   2x10 2x10 2x10         Lateral step downs R             Step up and overs                           Gait Training                                       Modalities

## 2023-08-11 ENCOUNTER — OFFICE VISIT (OUTPATIENT)
Dept: PHYSICAL THERAPY | Facility: CLINIC | Age: 58
End: 2023-08-11
Payer: COMMERCIAL

## 2023-08-11 DIAGNOSIS — M54.50 CHRONIC RIGHT-SIDED LOW BACK PAIN WITHOUT SCIATICA: Primary | ICD-10-CM

## 2023-08-11 DIAGNOSIS — G89.29 CHRONIC RIGHT-SIDED LOW BACK PAIN WITHOUT SCIATICA: Primary | ICD-10-CM

## 2023-08-11 DIAGNOSIS — M79.605 LEFT LEG PAIN: ICD-10-CM

## 2023-08-11 PROCEDURE — 97110 THERAPEUTIC EXERCISES: CPT | Performed by: PHYSICAL THERAPIST

## 2023-08-11 PROCEDURE — 97140 MANUAL THERAPY 1/> REGIONS: CPT | Performed by: PHYSICAL THERAPIST

## 2023-08-11 PROCEDURE — 97530 THERAPEUTIC ACTIVITIES: CPT | Performed by: PHYSICAL THERAPIST

## 2023-08-11 NOTE — PROGRESS NOTES
Daily Note     Today's date: 2023  Patient name: Nida Cruz  : 1965  MRN: [id number]  Referring provider: Stu Zamarripa DO  Dx:   Encounter Diagnosis     ICD-10-CM    1. Chronic right-sided low back pain without sciatica  M54.50     G89.29       2. Left leg pain  M79.605                      Subjective: Pt reports that she feels tired due to lack of sleep last night. Pt reports that she has been very active the past few days, resulting in soreness at night, but she is feeling better overall. Additionally, she was in the car for two hours and once she got out she didn't have any pain, which is a big improvement since her initial evaluation. Objective: See treatment diary below      Assessment: Pt still demonstrated a moderate amount of difficulty, required frequent rest breaks, and expressed an increase in soreness in her adductor muscles while performing SLRs and SLR VMO, therefore intensity of exercises were not progressed. Intensity of LAQ was progressed by increasing weight, pt demonstrated appropriate fatigue. Side stepping with theraband was introduced to plan of care to further strengthen glute medius; pt was verbally cued to control the eccentric phase in a slow, controlled manner. Overall, pt is progressing, evident through improved FOTO score. Plan: Continue per plan of care.       Precautions: HTN    Manuals         Sciatic nerve glides R  Assessed  Assessed , DC         STM R hamstring   RM The stick - EH The stick -RM The stick - RM                                  Neuro Re-Ed             SL stance on foam R             Mini squats on bosu ball                                                                              Ther Ex             Bike  5 min 5 min  L1 5 min L1 5 min L1        Supine sciatic nerve glides R HEP            SLR R HEP 2x10 2x10 3x10  3x10         Femoral nerve tensioner R  2x10  2x10 3x10 2x10        VMO SLR R  2x10 3x10 3x10 3x10        Clamshells R  3x10 OTB  3x10 OTB 3x10  OTB 3x10        LAQ R  3x10 2# 2#  3x10 2# 3x10  2.5# x25   5# 2x10        Seated HS stretch   3x30" B/L 30"x3 bilat 30"x3 bilat 30"x3 bilat        Standing hip abduction B/L  [place] TB  2x10 ea.    Pink  2x10  bilat Pink 2x10 bilat  GTB 2x10 ea        Side stepping      GTB x2 laps        Multi hip machine (hip flex, hip ext, abduction) B/L             Leg press - seat 7     SL  35#  2x10    45#  2x10 SL 45# 3x10    SL 65# 2x10 65# 3x10 SL 85#                                 Ther Activity             Eccentric sit to stands   2x10 2x10 2x10 2x10        Lateral step downs R             Step up and overs                           Gait Training                                       Modalities

## 2023-08-15 ENCOUNTER — OFFICE VISIT (OUTPATIENT)
Dept: PHYSICAL THERAPY | Facility: CLINIC | Age: 58
End: 2023-08-15
Payer: COMMERCIAL

## 2023-08-15 DIAGNOSIS — M79.605 LEFT LEG PAIN: ICD-10-CM

## 2023-08-15 DIAGNOSIS — M54.50 CHRONIC RIGHT-SIDED LOW BACK PAIN WITHOUT SCIATICA: Primary | ICD-10-CM

## 2023-08-15 DIAGNOSIS — G89.29 CHRONIC RIGHT-SIDED LOW BACK PAIN WITHOUT SCIATICA: Primary | ICD-10-CM

## 2023-08-15 PROCEDURE — 97140 MANUAL THERAPY 1/> REGIONS: CPT | Performed by: PHYSICAL THERAPIST

## 2023-08-15 PROCEDURE — 97110 THERAPEUTIC EXERCISES: CPT | Performed by: PHYSICAL THERAPIST

## 2023-08-15 NOTE — PROGRESS NOTES
Daily Note     Today's date: 8/15/2023  Patient name: Annamaria Hodges  : 1965  MRN: [id number]  Referring provider: Italia Carrillo DO  Dx:   Encounter Diagnosis     ICD-10-CM    1. Chronic right-sided low back pain without sciatica  M54.50     G89.29       2. Left leg pain  M79.605                      Subjective: Pt reports she is very sore and tired today. She worked out yesterday, then did her exercises, went for a walk, went to [employer], and then did water exercises and pool volleyball. Speed with walking caused knee symptoms but she was able to walk further than before. States she woke up and had difficulty achieving first couple steps but then got better as the morning went on. Objective: See treatment diary below      Assessment: No tenderness to palpation of distal HS musculature however soft tissue restrictions remain therefore continued STM. Introduced lateral step downs with good tolerance with mild lumbopelvic drop with initial repetitions but improved with increased reps. Able to tolerate increase weight on leg press with mild symptoms in patellar tendon bilaterally. Educate pt on quad musculature and quad and patellar tendon with good understanding. Educated pt to take rest days PRN to aid in soreness and symptom management. Plan: Continue per plan of care.       Precautions: HTN    Manuals 7/25 8/2 8/4 8/8 8/11 8/15       Sciatic nerve glides R  Assessed  Assessed , DC         STM R hamstring   RM The stick - EH The stick -RM The stick - RM AG the stick                                  Neuro Re-Ed             SL stance on foam R             Mini squats on bosu ball      [place]                                                                        Ther Ex             Bike  5 min 5 min  L1 5 min L1 5 min L1 5' L1       Supine sciatic nerve glides R HEP            SLR R HEP 2x10 2x10 3x10  3x10  3x10        Femoral nerve tensioner R  2x10  2x10 3x10 2x10 2x10        VMO SLR R  2x10 3x10 3x10  3x10 3x10        Clamshells R  3x10 OTB  3x10 OTB 3x10  OTB 3x10 Pink 2x10        LAQ R  3x10 2# 2#  3x10 2# 3x10  2.5# x25   5# 2x10 5#  3x10        Seated HS stretch   3x30" B/L 30"x3 bilat 30"x3 bilat 30"x3 bilat 3x30" ea. Standing hip abduction B/L  [place] TB  2x10 ea. Pink  2x10  bilat Pink 2x10 bilat  GTB 2x10 ea GTB 3x10 ea.         Side stepping      GTB x2 laps NV       Multi hip machine (hip flex, hip ext, abduction) B/L             Leg press - seat 7     SL  35#  2x10    45#  2x10 SL 45# 3x10    SL 65# 2x10 65# 3x10 SL 85#  SL 2x10        Bridges      2x10                    Ther Activity             Eccentric sit to stands   2x10 2x10 2x10 2x10        Lateral step downs R      4" x10        Step up and overs                           Gait Training                                       Modalities

## 2023-08-18 ENCOUNTER — OFFICE VISIT (OUTPATIENT)
Dept: PHYSICAL THERAPY | Facility: CLINIC | Age: 58
End: 2023-08-18
Payer: COMMERCIAL

## 2023-08-18 DIAGNOSIS — G89.29 CHRONIC RIGHT-SIDED LOW BACK PAIN WITHOUT SCIATICA: Primary | ICD-10-CM

## 2023-08-18 DIAGNOSIS — M79.605 LEFT LEG PAIN: ICD-10-CM

## 2023-08-18 DIAGNOSIS — M54.50 CHRONIC RIGHT-SIDED LOW BACK PAIN WITHOUT SCIATICA: Primary | ICD-10-CM

## 2023-08-18 PROCEDURE — 97140 MANUAL THERAPY 1/> REGIONS: CPT

## 2023-08-18 PROCEDURE — 97530 THERAPEUTIC ACTIVITIES: CPT

## 2023-08-18 PROCEDURE — 97110 THERAPEUTIC EXERCISES: CPT

## 2023-08-18 NOTE — PROGRESS NOTES
Daily Note     Today's date: 2023  Patient name: Craig Segura  : 1965  MRN: [id number]  Referring provider: Faina Fisher DO  Dx:   Encounter Diagnosis     ICD-10-CM    1. Chronic right-sided low back pain without sciatica  M54.50     G89.29       2. Left leg pain  M79.605                      Subjective: Patient reported having a very active day yesterday, playing pickle ball and doing water aerobics. Objective: See treatment diary below. Assessment: Less tenderness and restrictions through distal hamstring with manual interventions. Added hamstring strengthening with fair tolerance, experiencing increased cramping on L vs R, modifying remaining exercises in treatment plan to avoid any increased hamstring activation on L. Educated patient on keeping up with hydration on her most active days with the heat. Plan: Continue per plan of care. Precautions: HTN    Manuals 7/25 8/2 8/4 8/8 8/11 8/15 8/18      Sciatic nerve glides R  Assessed  Assessed , DC         STM R hamstring   RM The stick - EH The stick -RM The stick - RM AG the stick  The stick - EH                                Neuro Re-Ed             SL stance on foam R             Mini squats on bosu ball      [place]                                                                        Ther Ex             Bike  5 min 5 min  L1 5 min L1 5 min L1 5' L1 5 min  L1      Supine sciatic nerve glides R HEP            SLR R HEP 2x10 2x10 3x10  3x10  3x10  3x10      Femoral nerve tensioner R  2x10  2x10 3x10 2x10 2x10  2x10      VMO SLR R  2x10 3x10 3x10  3x10 3x10  3x10      Clamshells R  3x10 OTB  3x10 OTB 3x10  OTB 3x10 Pink 2x10  Pink  2x10      LAQ R  3x10 2# 2#  3x10 2# 3x10  2.5# x25   5# 2x10 5#  3x10  5#  3x10      Seated HS stretch   3x30" B/L 30"x3 bilat 30"x3 bilat 30"x3 bilat 3x30" ea. Standing hip abduction B/L  [place] TB  2x10 ea. Pink  2x10  bilat Pink 2x10 bilat  GTB 2x10 ea GTB 3x10 ea.         Side stepping      GTB x2 laps NV       Multi hip machine (hip flex, hip ext, abduction) B/L             Leg press - seat 7     SL  35#  2x10    45#  2x10 SL 45# 3x10    SL 65# 2x10 65# 3x10 SL 85#  SL 2x10  SL  85#  2x10      Bridges      2x10 With pball hamstring curls  2x10                   Ther Activity             Eccentric sit to stands   2x10 2x10 2x10 2x10        Lateral step downs R      4" x10  4"  2x10      Step up and overs                           Gait Training                                       Modalities

## 2023-09-05 ENCOUNTER — APPOINTMENT (OUTPATIENT)
Dept: PHYSICAL THERAPY | Facility: CLINIC | Age: 58
End: 2023-09-05
Payer: COMMERCIAL

## 2023-09-08 ENCOUNTER — OFFICE VISIT (OUTPATIENT)
Dept: PHYSICAL THERAPY | Facility: CLINIC | Age: 58
End: 2023-09-08
Payer: COMMERCIAL

## 2023-09-08 DIAGNOSIS — G89.29 CHRONIC RIGHT-SIDED LOW BACK PAIN WITHOUT SCIATICA: Primary | ICD-10-CM

## 2023-09-08 DIAGNOSIS — M79.605 LEFT LEG PAIN: ICD-10-CM

## 2023-09-08 DIAGNOSIS — M54.50 CHRONIC RIGHT-SIDED LOW BACK PAIN WITHOUT SCIATICA: Primary | ICD-10-CM

## 2023-09-08 PROCEDURE — 97110 THERAPEUTIC EXERCISES: CPT

## 2023-09-08 PROCEDURE — 97140 MANUAL THERAPY 1/> REGIONS: CPT

## 2023-09-08 NOTE — PROGRESS NOTES
Daily Note     Today's date: 2023  Patient name: Adele Rosas  : 1965  MRN: [id number]  Referring provider: Aura Schirmer, DO  Dx:   Encounter Diagnosis     ICD-10-CM    1. Chronic right-sided low back pain without sciatica  M54.50     G89.29       2. Left leg pain  M79.605                      Subjective: Patient seen for first visit since  as she was away on vacation. Feels she may have had a slight setback since as she is now having more discomfort in back and into R knee. Leg will feel fatigued and heavy. Wonders if sitting in the car for 3.5 hour ride played into her symptoms. Objective: See treatment diary below. Assessment: Assessed sciatic nerve tension on R with minimal tension noted. STM performed to lumbar paraspinals and glute med with more tenderness on R vs L. MET performed to correct pelvic rotation with reduced symptoms noted post session on R. L leg felt more fatigued and heavy by end of session. Held hamstring strengthening due to cramping last session and fear of this re-occurring. Advised patient to monitor symptoms and will plan to modify program as needed next session. Plan: Continue per plan of care.            Precautions: HTN    Manuals 7/25 8/2 8/4 8/8 8/11 8/15 8/18 9/8     Sciatic nerve glides R  Assessed  Assessed , DC    Assessed - EH     STM R hamstring   RM The stick - EH The stick -RM The stick - RM AG the stick  The stick - EH The stick to glute med - EH                               Neuro Re-Ed             SL stance on foam R             Mini squats on bosu ball      [place]                                                                        Ther Ex             Bike  5 min 5 min  L1 5 min L1 5 min L1 5' L1 5 min  L1 5 min  L1     Supine sciatic nerve glides R HEP            SLR R HEP 2x10 2x10 3x10  3x10  3x10  3x10 3x10     Femoral nerve tensioner R  2x10  2x10 3x10 2x10 2x10  2x10      VMO SLR R  2x10 3x10 3x10  3x10 3x10  3x10 3x10 Clamshells R  3x10 OTB  3x10 OTB 3x10  OTB 3x10 Pink 2x10  Pink  2x10 Pink  3x10     LAQ R  3x10 2# 2#  3x10 2# 3x10  2.5# x25   5# 2x10 5#  3x10  5#  3x10 5#  3x10     Seated HS stretch   3x30" B/L 30"x3 bilat 30"x3 bilat 30"x3 bilat 3x30" ea. Standing hip abduction B/L  [place] TB  2x10 ea. Pink  2x10  bilat Pink 2x10 bilat  GTB 2x10 ea GTB 3x10 ea.         Side stepping      GTB x2 laps NV       Multi hip machine (hip flex, hip ext, abduction) B/L             Leg press - seat 7     SL  35#  2x10    45#  2x10 SL 45# 3x10    SL 65# 2x10 65# 3x10 SL 85#  SL 2x10  SL  85#  2x10 SL  85#  3x10     Bridges      2x10 With pball hamstring curls  2x10                   Ther Activity             Eccentric sit to stands   2x10 2x10 2x10 2x10        Lateral step downs R      4" x10  4"  2x10 4"  2x10     Step up and overs                           Gait Training                                       Modalities

## 2023-09-11 ENCOUNTER — OFFICE VISIT (OUTPATIENT)
Dept: PHYSICAL THERAPY | Facility: CLINIC | Age: 58
End: 2023-09-11
Payer: COMMERCIAL

## 2023-09-11 DIAGNOSIS — M54.50 CHRONIC RIGHT-SIDED LOW BACK PAIN WITHOUT SCIATICA: Primary | ICD-10-CM

## 2023-09-11 DIAGNOSIS — M79.605 LEFT LEG PAIN: ICD-10-CM

## 2023-09-11 DIAGNOSIS — G89.29 CHRONIC RIGHT-SIDED LOW BACK PAIN WITHOUT SCIATICA: Primary | ICD-10-CM

## 2023-09-11 PROCEDURE — 97530 THERAPEUTIC ACTIVITIES: CPT | Performed by: PHYSICAL THERAPIST

## 2023-09-11 PROCEDURE — 97140 MANUAL THERAPY 1/> REGIONS: CPT | Performed by: PHYSICAL THERAPIST

## 2023-09-11 PROCEDURE — 97110 THERAPEUTIC EXERCISES: CPT | Performed by: PHYSICAL THERAPIST

## 2023-09-11 NOTE — PROGRESS NOTES
Daily Note     Today's date: 2023  Patient name: Iris Ramos  : 1965  MRN: [id number]  Referring provider: Taj Carlson DO  Dx:   Encounter Diagnosis     ICD-10-CM    1. Chronic right-sided low back pain without sciatica  M54.50     G89.29       2. Left leg pain  M79.605                      Subjective: Pt reports her low back is very stiff and she feels the tailbone is bothersome. She is not sure if it is from the 3 hours to the beach and then the 3 hours back home. States her knee intensity is less than when she first started but it is not as good. Felt better following last session but did feel like her L leg was heavier than her R. Feels like she is uneven. Objective: See treatment diary below. Assessment: Performed lumbar gapping bilaterally due to increased lumbar hypomobility. (+) cavitation with L sidelying lumbar gapping with increased relief of symptoms post manuals when ambulating. Assessed sciatic nerve tension bilaterally based on subjective report of increased LLE heaviness. (+) bilateral sciatic nerve tension however increased nerve tension LLE>RLE with increased relief of symptoms post manual nerve glides. Increased tenderness and soft tissue restrictions in R glute med during STM. Performed bridges without pball due to increased leg cramping previous sessions; no leg cramping with bridge only. Overall, increased relief of symptoms post session with minimal low back stiffness. Plan: Continue per plan of care.            Precautions: HTN    Manuals  8 8/4 8/8 8/11 8/15 8/18 9/8 9/11    Sciatic nerve glides R  Assessed  Assessed , DC    Assessed - EH B/L   AG    STM R hamstring   RM The stick - EH The stick -RM The stick - RM AG the stick  The stick - EH The stick to glute med - EH The stick to glute med - AG    S/L lumbar gapping         B/L AG                 Neuro Re-Ed             SL stance on foam R             Mini squats on bosu ball      NV Ther Ex             Bike  5 min 5 min  L1 5 min L1 5 min L1 5' L1 5 min  L1 5 min  L1 5' L1    Supine sciatic nerve glides R HEP        Reviewed    SLR R HEP 2x10 2x10 3x10  3x10  3x10  3x10 3x10 3x10     Femoral nerve tensioner R  2x10  2x10 3x10 2x10 2x10  2x10      VMO SLR R  2x10 3x10 3x10  3x10 3x10  3x10 3x10 3x10     Clamshells R  3x10 OTB  3x10 OTB 3x10  OTB 3x10 Pink 2x10  Pink  2x10 Pink  3x10 Pink 3x10     LAQ R  3x10 2# 2#  3x10 2# 3x10  2.5# x25   5# 2x10 5#  3x10  5#  3x10 5#  3x10     Seated HS stretch   3x30" B/L 30"x3 bilat 30"x3 bilat 30"x3 bilat 3x30" ea. 3x30" ea. Standing hip abduction B/L  [place] TB  2x10 ea. Pink  2x10  bilat Pink 2x10 bilat  GTB 2x10 ea GTB 3x10 ea. Side stepping      GTB x2 laps NV       Multi hip machine (hip flex, hip ext, abduction) B/L             Leg press - seat 7     SL  35#  2x10    45#  2x10 SL 45# 3x10    SL 65# 2x10 65# 3x10 SL 85#  SL 2x10  SL  85#  2x10 SL  85#  3x10 SL  85#  3x10 ea.      Bridges      2x10 With pball hamstring curls  2x10  3x10                  Ther Activity             Eccentric sit to stands   2x10 2x10 2x10 2x10        Lateral step downs R      4" x10  4"  2x10 4"  2x10 4" 2x10     Step up and overs                           Gait Training                                       Modalities

## 2023-09-14 ENCOUNTER — OFFICE VISIT (OUTPATIENT)
Dept: PHYSICAL THERAPY | Facility: CLINIC | Age: 58
End: 2023-09-14
Payer: COMMERCIAL

## 2023-09-14 DIAGNOSIS — M54.50 CHRONIC RIGHT-SIDED LOW BACK PAIN WITHOUT SCIATICA: Primary | ICD-10-CM

## 2023-09-14 DIAGNOSIS — G89.29 CHRONIC RIGHT-SIDED LOW BACK PAIN WITHOUT SCIATICA: Primary | ICD-10-CM

## 2023-09-14 DIAGNOSIS — M79.605 LEFT LEG PAIN: ICD-10-CM

## 2023-09-14 PROCEDURE — 97140 MANUAL THERAPY 1/> REGIONS: CPT

## 2023-09-14 PROCEDURE — 97110 THERAPEUTIC EXERCISES: CPT

## 2023-09-14 NOTE — PROGRESS NOTES
Daily Note     Today's date: 2023  Patient name: Russel Franklin  : 1965  MRN: [id number]  Referring provider: Shannan Ramirez DO  Dx:   Encounter Diagnosis     ICD-10-CM    1. Chronic right-sided low back pain without sciatica  M54.50     G89.29       2. Left leg pain  M79.605                      Subjective: Patient reported while playing pickleball this morning she went and stepped a little to far for a ball and her sciatica on L is now more irritated. She is suppose to travel again this evening but is worried as she feels this previous 3 hour car ride increased her symptoms. Objective: See treatment diary below. Assessment: L sided LB pain with R sidelying lumbar gapping that improved with amb. Glute med tightness L > R with presence of trigger point. Educated on self release with ball at wall (purchased lacrosse ball today for home) as she found significant relief with this. Demonstrating improved glute med strengthening per ease of clamshells with current resistance and will plan to progress NV. Post session patient admitted feeling better than when she arrived. Plan: Continue per plan of care.            Precautions: HTN    Manuals 7/25 8/2 8/4 8/8 8/11 8/15 8/18 9/8 9/11 9/14   Sciatic nerve glides R  Assessed  Assessed , DC    Assessed - EH B/L   AG Bilat - EH   STM R hamstring   RM The stick - EH The stick -RM The stick - RM AG the stick  The stick - EH The stick to glute med - EH The stick to glute med - AG The stick to glute med - EH   S/L lumbar gapping         B/L AG Bilat - AG                Neuro Re-Ed             SL stance on foam R             Mini squats on bosu ball      NV                                                                        Ther Ex             Bike  5 min 5 min  L1 5 min L1 5 min L1 5' L1 5 min  L1 5 min  L1 5' L1 5 min  L1   Supine sciatic nerve glides R HEP        Reviewed    SLR R HEP 2x10 2x10 3x10  3x10  3x10  3x10 3x10 3x10  3x10   Femoral nerve tensioner R  2x10  2x10 3x10 2x10 2x10  2x10      VMO SLR R  2x10 3x10 3x10  3x10 3x10  3x10 3x10 3x10  3x10   Clamshells R  3x10 OTB  3x10 OTB 3x10  OTB 3x10 Pink 2x10  Pink  2x10 Pink  3x10 Pink 3x10  Pink  3x10 - increase NV   LAQ R  3x10 2# 2#  3x10 2# 3x10  2.5# x25   5# 2x10 5#  3x10  5#  3x10 5#  3x10     Seated HS stretch   3x30" B/L 30"x3 bilat 30"x3 bilat 30"x3 bilat 3x30" ea. 3x30" ea. Standing hip abduction B/L  [place] TB  2x10 ea. Pink  2x10  bilat Pink 2x10 bilat  GTB 2x10 ea GTB 3x10 ea. Side stepping      GTB x2 laps NV       Multi hip machine (hip flex, hip ext, abduction) B/L             Leg press - seat 7     SL  35#  2x10    45#  2x10 SL 45# 3x10    SL 65# 2x10 65# 3x10 SL 85#  SL 2x10  SL  85#  2x10 SL  85#  3x10 SL  85#  3x10 ea.   SL  95#  3x10  bilat     Bridges      2x10 With pball hamstring curls  2x10  3x10  3x10                Ther Activity             Eccentric sit to stands   2x10 2x10 2x10 2x10        Lateral step downs R      4" x10  4"  2x10 4"  2x10 4" 2x10  4"  2x10   Step up and overs                           Gait Training                                       Modalities

## 2023-09-19 ENCOUNTER — OFFICE VISIT (OUTPATIENT)
Dept: PHYSICAL THERAPY | Facility: CLINIC | Age: 58
End: 2023-09-19
Payer: COMMERCIAL

## 2023-09-19 DIAGNOSIS — M54.50 CHRONIC RIGHT-SIDED LOW BACK PAIN WITHOUT SCIATICA: Primary | ICD-10-CM

## 2023-09-19 DIAGNOSIS — M79.605 LEFT LEG PAIN: ICD-10-CM

## 2023-09-19 DIAGNOSIS — G89.29 CHRONIC RIGHT-SIDED LOW BACK PAIN WITHOUT SCIATICA: Primary | ICD-10-CM

## 2023-09-19 PROCEDURE — 97110 THERAPEUTIC EXERCISES: CPT | Performed by: PHYSICAL THERAPIST

## 2023-09-19 PROCEDURE — 97140 MANUAL THERAPY 1/> REGIONS: CPT | Performed by: PHYSICAL THERAPIST

## 2023-09-19 NOTE — PROGRESS NOTES
PT Re-Evaluation     Today's date: 2023  Patient name: Rosalia Smith  : 1965  MRN: [id number]  Referring provider: Lia Martin DO  Dx:   Encounter Diagnosis     ICD-10-CM    1. Chronic right-sided low back pain without sciatica  M54.50     G89.29       2. Left leg pain  M79.605                      Assessment  Assessment details: Rosalia Smith is a 60 y.o. female referred to outpatient physical therapy for right sided leg pain. Pt report 75-80% improvement which correlates to improved FOTO outcomes. Improvements include decreased pain at worst, improved RLE neural tension, improved LE strength, and improved tolerance to activity. Impairments that reman include pain, neural tension and decreased LE strength. Limitations include prolonged sitting, limiting patient from playing with grandchildren and walking for enjoyment. Pt would continue to benefit from skilled physical therapy to address the limitations above to allow return to prior level of function. Impairments: abnormal gait, activity intolerance, impaired physical strength, lacks appropriate home exercise program and pain with function    Goals  Short term goals (2-3 weeks):  1. Pt will demonstrate an understanding in HEP. (MET)  2. Increase RLE strength by 1/2 in all deficit planes. (MET)  3. Negative passive SLR of RLE. (MET)    Long term goals (4-8 weeks):  1. Pt will meet or exceed expected FOTO outcomes. (MET)  2. Pt will report ability to play with grandchildren with minimal pain (2/10). (PROGRESSING)  3. Walk >30 minutes with minimal pain (2/10). (MET)  4. Descend steps with minimal pain (2/10).  (MET)      Plan  Patient would benefit from: skilled physical therapy  Planned modality interventions: low level laser therapy, TENS, thermotherapy: hydrocollator packs, traction, unattended electrical stimulation and cryotherapy  Planned therapy interventions: activity modification, ADL retraining, balance, balance/weight bearing training, body mechanics training, flexibility, stretching, functional ROM exercises, home exercise program, therapeutic exercise, therapeutic activities, strengthening, patient education, neuromuscular re-education, behavior modification, massage, manual therapy, joint mobilization and IASTM  Frequency: 2x week  Duration in weeks: 8        Subjective Evaluation    History of Present Illness  Mechanism of injury: Patient presents to outpatient physical therapy with chief complaint of right leg pain. Patient reports sudden that began end of February / beginning of March while doing yoga. Patient Denies episodes of numbness or tingling in RLE. Pain described as aching and fatigued in R leg. Pain rating currently 1/10, at best 0/10 and at worst 7/10. Patient states limitations with prolonged knee flexion and extension and then changing of positions after the prolonged position. Pt also states limitations with descending stairs and walking (longer than 20 minutes). Aggravating factors include going down the stairs, walking (longer than 20 mins), and rising after sitting. Patient states use of swimming and advil for relief of symptoms. Pt goals for therapy include to decrease R knee pain and decrease R leg fatigue. 23: Pt reports she notices the symptoms the most when she sits >15 minutes she gets up and feels the symptoms more in the low back and tail bone. Does feel some issues in the AM. She is able then to get up and work itself out. Very minimal occasional symptoms behind the R knee but no where close to what it was. States overall she has improved since starting PT. Pt reports she has not noticed any issues walking except when she recently got back from vacation. No issues with descending stairs.     Patient Goals  Patient goals for therapy: decreased pain, increased strength, independence with ADLs/IADLs and return to sport/leisure activities    Pain  Current pain ratin  At best pain ratin  At worst pain ratin  Quality: dull ache  Relieving factors: medications  Aggravating factors: walking      Diagnostic Tests    [facility] comments: 7/10/23: X ray R knee per chart "FINDINGS:     There is no acute fracture or dislocation.     There is no joint effusion.     Mild osteoarthritis with narrowing of the medial tibiofemoral and patellofemoral joints and small osteophytes seen.     No lytic or blastic osseous lesion.     Soft tissues are unremarkable."      Objective     Concurrent Complaints  Negative for bladder dysfunction, bowel dysfunction and saddle (S4) numbness    Palpation   Left   Hypertonic in the gluteus medius and piriformis. Tenderness of the gluteus medius and piriformis. Tenderness     Right Knee   Tenderness in the MCL (distal), MCL (proximal), medial joint line and medial patella. No tenderness in the inferior patella, lateral joint line, lateral patella, LCL (distal), LCL (proximal), patellar tendon, quadriceps tendon and superior patella.      Neurological Testing     Reflexes   Left   Patellar (L4): normal (2+)  Achilles (S1): normal (2+)    Right   Patellar (L4): normal (2+)  Achilles (S1): normal (2+)    Active Range of Motion     Lumbar   Flexion:  Restriction level: minimal  Extension:  Restriction level: minimal  Left lateral flexion:  Restriction level: minimal  Right lateral flexion:  Restriction level: minimal  Left Knee   Flexion: 135 degrees   Extension: 0 degrees     Right Knee   Flexion: 135 degrees with pain  Extension: 0 degrees with pain  Mechanical Assessment    Cervical      Thoracic      Lumbar    Standing flexion: repeated movements   Pain location:no change  Standing extension: repeated movements  Pain location: peripheralized  Pain level: produced    Strength/Myotome Testing     Left Hip   Planes of Motion   Flexion: 4+  Abduction: 4-    Right Hip   Planes of Motion   Flexion: 4  Abduction: 4    Left Knee   Flexion: 4+  Extension: 4+  Quadriceps contraction: fair    Right Knee   Flexion: 4+  Extension: 4+  Quadriceps contraction: poor    Left Ankle/Foot   Dorsiflexion: 4+  Plantar flexion: 4+    Right Ankle/Foot   Dorsiflexion: 4+  Plantar flexion: 4+    Tests     Lumbar     Left   Positive passive SLR and slump test.     Right   Negative passive SLR and slump test.     Left Hip   Positive Ely's. Negative femoral nerve tension. Right Hip   Positive Ely's. Negative WILLAM, FADIR and femoral nerve tension. Right Knee   Positive lateral Jenny and medial Jenny. Negative anterior drawer, posterior drawer, valgus stress test at 0 degrees, valgus stress test at 30 degrees, varus stress test at 0 degrees and varus stress test at 30 degrees. Precautions: HTN    Manuals 9/19    8/11 8/15 8/18 9/8 9/11 9/14   Sciatic nerve glides R L only AG       Assessed - EH B/L   AG Bilat - EH   STM R hamstring  The stick   glute med AG    The stick - RM AG the stick  The stick - EH The stick to glute med - EH The stick to glute med - AG The stick to glute med - EH   S/L lumbar gapping         B/L AG Bilat - AG   Re-eval AG            Neuro Re-Ed             SL stance on foam R             Mini squats on bosu ball      NV                                                                        Ther Ex             Bike 5' L1    5 min L1 5' L1 5 min  L1 5 min  L1 5' L1 5 min  L1   Supine sciatic nerve glides R Reviewed seated         Reviewed    SLR R 3x10 B/L    3x10  3x10  3x10 3x10 3x10  3x10   Femoral nerve tensioner R     2x10 2x10  2x10      VMO SLR R 3x10 B/L    3x10 3x10  3x10 3x10 3x10  3x10   Clamshells R NV    OTB 3x10 Pink 2x10  Pink  2x10 Pink  3x10 Pink 3x10  Pink  3x10 - increase NV   LAQ R     2.5# x25   5# 2x10 5#  3x10  5#  3x10 5#  3x10     Seated HS stretch  Reviewed seated, standing, and supine    30"x3 bilat 3x30" ea. 3x30" ea. Standing hip abduction B/L NV    GTB 2x10 ea GTB 3x10 ea.         Side stepping  NV    GTB x2 laps NV       Multi hip machine (hip flex, hip ext, abduction) B/L             Leg press - seat 7   NV    65# 3x10 SL 85#  SL 2x10  SL  85#  2x10 SL  85#  3x10 SL  85#  3x10 ea.   SL  95#  3x10  bilat     Bridges      2x10 With pball hamstring curls  2x10  3x10  3x10                Ther Activity             Eccentric sit to stands      2x10        Lateral step downs R NV     4" x10  4"  2x10 4"  2x10 4" 2x10  4"  2x10   Step up and overs                           Gait Training                                       Modalities

## 2023-09-21 ENCOUNTER — OFFICE VISIT (OUTPATIENT)
Dept: PHYSICAL THERAPY | Facility: CLINIC | Age: 58
End: 2023-09-21
Payer: COMMERCIAL

## 2023-09-21 DIAGNOSIS — G89.29 CHRONIC RIGHT-SIDED LOW BACK PAIN WITHOUT SCIATICA: Primary | ICD-10-CM

## 2023-09-21 DIAGNOSIS — M54.50 CHRONIC RIGHT-SIDED LOW BACK PAIN WITHOUT SCIATICA: Primary | ICD-10-CM

## 2023-09-21 DIAGNOSIS — M79.605 LEFT LEG PAIN: ICD-10-CM

## 2023-09-21 PROCEDURE — 97110 THERAPEUTIC EXERCISES: CPT | Performed by: PHYSICAL THERAPIST

## 2023-09-21 PROCEDURE — 97140 MANUAL THERAPY 1/> REGIONS: CPT | Performed by: PHYSICAL THERAPIST

## 2023-09-21 NOTE — PROGRESS NOTES
Daily Note     Today's date: 2023  Patient name: Mary Gannon  : 1965  MRN: [id number]  Referring provider: Marisela Mary DO  Dx:   Encounter Diagnosis     ICD-10-CM    1. Chronic right-sided low back pain without sciatica  M54.50     G89.29       2. Left leg pain  M79.605                      Subjective: Pt reports she felt worse after last session. She felt okay leaving PT but then an hour later her knee hurt and both her sciatic nerve bothered her. States she laid low and it feels a little better today. Objective: See treatment diary below      Assessment: No neural symptom provocation with manual nerve glides. Increased L sided low back pain with plinth mobility with supine to R sidelying. Improved with ambulation. Good tolerance to progression of TB intensity with increased fatigue as anticipated. Increased proximal HS cramping with bridges therefore decreased repetitions performed and manual therapy STM performed to aid in symptoms. Overall, no symptoms post session. Plan: Continue per plan of care.       Precautions: HTN    Manuals 9/19 9/21   8/11 8/15 8/18 9/8 9/11 9/14   Sciatic nerve glides R L only AG AG B/L       Assessed - EH B/L   AG Bilat - EH   STM R hamstring  The stick   glute med AG The stick   L glute med AG    L HS proximally   The stick - RM AG the stick  The stick - EH The stick to glute med -  [facility] The stick to glute med - AG The stick to glute med - EH   S/L lumbar gapping  AG       B/L AG Bilat - AG   Re-eval AG            Neuro Re-Ed             SL stance on foam R             Mini squats on bosu ball      NV                                                                        Ther Ex             Bike 5' L1 5' L1   5 min L1 5' L1 5 min  L1 5 min  L1 5' L1 5 min  L1   Supine sciatic nerve glides R Reviewed seated         Reviewed    SLR R 3x10 B/L 3x10 B/L   3x10  3x10  3x10 3x10 3x10  3x10   Femoral nerve tensioner R     2x10 2x10  2x10      VMO SLR R 3x10 B/L 3x10 B/L   3x10 3x10  3x10 3x10 3x10  3x10   Clamshells R NV GTB 2x10 ea. OTB 3x10 Pink 2x10  Pink  2x10 Pink  3x10 Pink 3x10  Pink  3x10 - increase NV   LAQ R     2.5# x25   5# 2x10 5#  3x10  5#  3x10 5#  3x10     Seated HS stretch  Reviewed seated, standing, and supine    30"x3 bilat 3x30" ea. 3x30" ea. Standing hip abduction B/L NV BTB 3x10 ea. GTB 2x10 ea GTB 3x10 ea. Side stepping  NV GTB x2 laps at [address] x2 laps NV       Multi hip machine (hip flex, hip ext, abduction) B/L             Leg press - seat 7   NV SL  95#  3x10 ea. 65# 3x10 SL 85#  SL 2x10  SL  85#  2x10 SL  85#  3x10 SL  85#  3x10 ea.   SL  95#  3x10  bilat     Bridges  x10     2x10 With pball hamstring curls  2x10  3x10  3x10                Ther Activity             Eccentric sit to stands      2x10        Lateral step downs R NV     4" x10  4"  2x10 4"  2x10 4" 2x10  4"  2x10   Step up and overs                           Gait Training                                       Modalities

## 2023-09-25 ENCOUNTER — APPOINTMENT (OUTPATIENT)
Dept: PHYSICAL THERAPY | Facility: CLINIC | Age: 58
End: 2023-09-25
Payer: COMMERCIAL

## 2023-09-27 ENCOUNTER — APPOINTMENT (OUTPATIENT)
Dept: PHYSICAL THERAPY | Facility: CLINIC | Age: 58
End: 2023-09-27
Payer: COMMERCIAL

## 2023-10-02 ENCOUNTER — OFFICE VISIT (OUTPATIENT)
Dept: PHYSICAL THERAPY | Facility: CLINIC | Age: 58
End: 2023-10-02
Payer: COMMERCIAL

## 2023-10-02 DIAGNOSIS — M54.50 CHRONIC RIGHT-SIDED LOW BACK PAIN WITHOUT SCIATICA: Primary | ICD-10-CM

## 2023-10-02 DIAGNOSIS — G89.29 CHRONIC RIGHT-SIDED LOW BACK PAIN WITHOUT SCIATICA: Primary | ICD-10-CM

## 2023-10-02 DIAGNOSIS — M79.605 LEFT LEG PAIN: ICD-10-CM

## 2023-10-02 PROCEDURE — 97140 MANUAL THERAPY 1/> REGIONS: CPT | Performed by: PHYSICAL THERAPIST

## 2023-10-02 PROCEDURE — 97110 THERAPEUTIC EXERCISES: CPT | Performed by: PHYSICAL THERAPIST

## 2023-10-02 NOTE — PROGRESS NOTES
Daily Note     Today's date: 10/2/2023  Patient name: Shira Pinto  : 1965  MRN: [id number]  Referring provider: Maria Elena Perkins DO  Dx:   Encounter Diagnosis     ICD-10-CM    1. Chronic right-sided low back pain without sciatica  M54.50     G89.29       2. Left leg pain  M79.605                      Subjective: Pt reports her back pain has been on and off. States her R knee has been clicking and feels like it is moving in and out her groove and she thinks it could be when she is standing still. Pt reports she was able to play pickle ball and had no issues. States she was also on the ladder and it made her calves really tight and maybe that is when her knee pain also started. She was sick this past week and did not perform exercises and just rested. Pt reports therapy has really helped but she may need to go back to her doctor since it has been over 6 weeks since starting therapy. Objective: See treatment diary below      Assessment: Increased R patella lateral tracking with clicking therefore performed medial glide with McConnel taping with no symptoms throughout session. Mild neural tension with L manual nerve glides; decreased symptoms compared to previous sessions. Glute med and proximal HS soft tissue restrictions remain during manual therapy. Increased lumbar symptoms with bridges however no peripheralization. Held on progressions this session due to recent sickness; significant fatigue post session with resumption of interventions. Plan: Continue per plan of care.       Precautions: HTN    Manuals 9/19 9/21 10/2  8/11 8/15 8/18 9/8 9/11 9/14   Sciatic nerve glides R L only AG AG B/L  L only AG     Assessed - EH B/L   AG Bilat - EH   STM R hamstring  The stick   glute med AG The stick   L glute med AG    L HS proximally The stick   L glute med AG    L HS proximally  The stick - RM AG the stick  The stick - EH The stick to glute med - EH The stick to glute med - AG The stick to glute [facility]   R knee medial glie Myriam taping    AG          S/L lumbar gapping  AG       B/L AG Bilat - AG   Re-eval AG            Neuro Re-Ed             SL stance on foam R             Mini squats on bosu ball      NV                                                                        Ther Ex             Bike 5' L1 5' L1 5' L1   5 min L1 5' L1 5 min  L1 5 min  L1 5' L1 5 min  L1   Supine sciatic nerve glides R Reviewed seated         Reviewed    SLR R 3x10 B/L 3x10 B/L 3x10 ea. 3x10  3x10  3x10 3x10 3x10  3x10   Femoral nerve tensioner R     2x10 2x10  2x10      VMO SLR R 3x10 B/L 3x10 B/L 3x10 ea. 3x10 3x10  3x10 3x10 3x10  3x10   Clamshells R NV GTB 2x10 ea. GTB 3x10 ea. OTB 3x10 Pink 2x10  Pink  2x10 Pink  3x10 Pink 3x10  Pink  3x10 - increase NV   LAQ R     2.5# x25   5# 2x10 5#  3x10  5#  3x10 5#  3x10     Seated HS stretch  Reviewed seated, standing, and supine    30"x3 bilat 3x30" ea. 3x30" ea. Standing hip abduction B/L NV BTB 3x10 ea. GTB 3x10 ea. GTB 2x10 ea GTB 3x10 ea. Side stepping  NV GTB x2 laps at [address] 25ftx4   GTB x2 laps NV       Multi hip machine (hip flex, hip ext, abduction) B/L             Leg press - seat 7   NV SL  95#  3x10 ea. SL  95#  3x10 ea. 65# 3x10 SL 85#  SL 2x10  SL  85#  2x10 SL  85#  3x10 SL  85#  3x10 ea.   SL  95#  3x10  bilat     Bridges  x10  2x10    2x10 With pball hamstring curls  2x10  3x10  3x10                Ther Activity             Eccentric sit to stands      2x10        Lateral step downs R NV     4" x10  4"  2x10 4"  2x10 4" 2x10  4"  2x10   Step up and overs                           Gait Training                                       Modalities

## 2023-10-04 ENCOUNTER — TELEPHONE (OUTPATIENT)
Dept: FAMILY MEDICINE CLINIC | Facility: CLINIC | Age: 58
End: 2023-10-04

## 2023-10-04 DIAGNOSIS — D36.10 NEUROMA: ICD-10-CM

## 2023-10-04 DIAGNOSIS — Z12.83 SCREENING FOR SKIN CANCER: Primary | ICD-10-CM

## 2023-10-04 NOTE — TELEPHONE ENCOUNTER
Referrals placed for [facility]. Tan's dermatology and Dr. Layne Sequeira for podiatry.

## 2023-10-04 NOTE — TELEPHONE ENCOUNTER
Patient requesting recommendation and referral for Dermatology for yearly skin check (not having any specific concerns) and also for Podiatry for a neuroma. Patient would like return call after referrals have been placed.

## 2023-10-09 ENCOUNTER — OFFICE VISIT (OUTPATIENT)
Dept: PHYSICAL THERAPY | Facility: CLINIC | Age: 58
End: 2023-10-09
Payer: COMMERCIAL

## 2023-10-09 DIAGNOSIS — G89.29 CHRONIC RIGHT-SIDED LOW BACK PAIN WITHOUT SCIATICA: Primary | ICD-10-CM

## 2023-10-09 DIAGNOSIS — M54.50 CHRONIC RIGHT-SIDED LOW BACK PAIN WITHOUT SCIATICA: Primary | ICD-10-CM

## 2023-10-09 DIAGNOSIS — M79.605 LEFT LEG PAIN: ICD-10-CM

## 2023-10-09 PROCEDURE — 97140 MANUAL THERAPY 1/> REGIONS: CPT | Performed by: PHYSICAL THERAPIST

## 2023-10-09 PROCEDURE — 97110 THERAPEUTIC EXERCISES: CPT | Performed by: PHYSICAL THERAPIST

## 2023-10-09 NOTE — PROGRESS NOTES
Daily Note     Today's date: 10/9/2023  Patient name: Rosaila Smith  : 1965  MRN: [id number]  Referring provider: Lia Martin DO  Dx:   Encounter Diagnosis     ICD-10-CM    1. Chronic right-sided low back pain without sciatica  M54.50     G89.29       2. Left leg pain  M79.605                      Subjective: Pt reports she did well with her car ride and was only stiff no increase in pain symptoms. States her knee pain is better and the tape did help with less clicking movements. States her tailbone feels better but she feels the symptoms right under the buttocks on the L but nothing in the foot. Objective: See treatment diary below      Assessment: Educated pt on centralization vs peripheralization with good understanding. Neural symptoms present proximally vs distally with manual nerve glides. Less soft tissue restrictions present during manual therapy however tenderness remains. Cues provided to improve clamshell form and to decreased hip rotation. Plan: Continue per plan of care. Precautions: HTN    Manuals 9/19 9/21 10/2 10/9         Sciatic nerve glides R L only AG AG B/L  L only AG L only          STM R hamstring  The stick   glute med AG The stick   L glute med AG    L HS proximally The stick   L glute med AG    L HS proximally The stick   L glute med AG    L HS proximally         R knee medial glie Miguel taping    AG          S/L lumbar gapping  AG           Re-eval AG            Neuro Re-Ed             SL stance on foam R             Mini squats on bosu ball                                                                              Ther Ex             Bike 5' L1 5' L1 5' L1  5' L1         Supine sciatic nerve glides R Reviewed seated    x30         SLR R 3x10 B/L 3x10 B/L 3x10 ea. 3x10 ea. Femoral nerve tensioner R             VMO SLR R 3x10 B/L 3x10 B/L 3x10 ea. 3x10 ea. Clamshells R NV GTB 2x10 ea. GTB 3x10 ea. GTB 3x10 ea.           LAQ R Seated HS stretch  Reviewed seated, standing, and supine            Standing hip abduction B/L NV BTB 3x10 ea. GTB 3x10 ea. GTB 3x10 ea. Side stepping  NV GTB x2 laps at [address] 25ftx4  GTB 25ftx4          Multi hip machine (hip flex, hip ext, abduction) B/L             Leg press - seat 7   NV SL  95#  3x10 ea. SL  95#  3x10 ea. SL  95#  3x10 ea.           Bridges  x10  2x10                        Ther Activity             Eccentric sit to stands              Lateral step downs R NV            Step up and overs                           Gait Training                                       Modalities

## 2023-10-10 ENCOUNTER — RA CDI HCC (OUTPATIENT)
Dept: OTHER | Facility: HOSPITAL | Age: 58
End: 2023-10-10

## 2023-10-10 NOTE — PROGRESS NOTES
[facility] coding opportunities       Chart reviewed, no opportunity found: CHART REVIEWED, NO OPPORTUNITY FOUND        Patients Insurance        Commercial Insurance: [address]

## 2023-10-17 ENCOUNTER — OFFICE VISIT (OUTPATIENT)
Dept: FAMILY MEDICINE CLINIC | Facility: CLINIC | Age: 58
End: 2023-10-17
Payer: COMMERCIAL

## 2023-10-17 VITALS
WEIGHT: 176 LBS | OXYGEN SATURATION: 98 % | TEMPERATURE: 98.1 F | RESPIRATION RATE: 16 BRPM | BODY MASS INDEX: 31.98 KG/M2 | DIASTOLIC BLOOD PRESSURE: 76 MMHG | SYSTOLIC BLOOD PRESSURE: 118 MMHG | HEART RATE: 77 BPM

## 2023-10-17 DIAGNOSIS — G89.29 CHRONIC BILATERAL LOW BACK PAIN WITH BILATERAL SCIATICA: Primary | ICD-10-CM

## 2023-10-17 DIAGNOSIS — M25.561 CHRONIC PAIN OF RIGHT KNEE: ICD-10-CM

## 2023-10-17 DIAGNOSIS — G89.29 CHRONIC PAIN OF RIGHT KNEE: ICD-10-CM

## 2023-10-17 DIAGNOSIS — M54.42 CHRONIC BILATERAL LOW BACK PAIN WITH BILATERAL SCIATICA: Primary | ICD-10-CM

## 2023-10-17 DIAGNOSIS — M54.41 CHRONIC BILATERAL LOW BACK PAIN WITH BILATERAL SCIATICA: Primary | ICD-10-CM

## 2023-10-17 PROCEDURE — 99214 OFFICE O/P EST MOD 30 MIN: CPT | Performed by: FAMILY MEDICINE

## 2023-10-17 NOTE — PROGRESS NOTES
Name: Katrina Traylor      : 1965      MRN: [id number]  Encounter Provider: Fausto Salmeron DO  Encounter Date: 10/17/2023   Encounter department: [facility] [address]     1. Chronic bilateral low back pain with bilateral sciatica  -     MRI lumbar spine wo contrast; Future; Expected date: 10/17/2023    2. Chronic pain of right knee  -     MRI knee right  wo contrast; Future; Expected date: 10/17/2023       Patient with bilateral leg pain right knee pain and low back pain. Patient has failed conservative treatment including NSAIDs and physical therapy. Will need MRI of right knee to rule out internal derangement and MRI of lumbar spine to rule out spinal stenosis or lumbar radiculopathy. Subjective      Patient was seen in follow-up from her back and leg pains. She was referred to physical therapy in July with complaints of right knee pain and low back pain. She is going to PT and in general has felt that things have improved only to have a setback every time she takes a long car ride. She has low back pain bilateral thigh pain and right knee pain. Review of Systems   Constitutional: Negative. Respiratory: Negative. Cardiovascular: Negative. Gastrointestinal: Negative. Genitourinary: Negative. Musculoskeletal:  Positive for arthralgias and back pain. Psychiatric/Behavioral: Negative.          Current Outpatient Medications on File Prior to Visit   Medication Sig   • ALPRAZolam (XANAX) 0.5 mg tablet TAKE HALF TABLET BY MOUTH 2 TIMES A DAY AS NEEDED FOR ANXIETY   • Calcium-Vitamins C & D (CALCIUM/C/D PO) Take by mouth   • Cholecalciferol 50 MCG ( UT) CAPS Take by mouth   • estradiol (ESTRACE VAGINAL) 0.1 mg/g vaginal cream Twice weekly   • fluticasone (FLONASE) 50 mcg/act nasal spray 1 spray into each nostril   • L-Lysine 500 MG TABS Take by mouth daily   • lisinopril (ZESTRIL) 5 mg tablet 2.5 mg   • Multiple Vitamins-Minerals (MULTIVITAL PO) Take by mouth   • Synthroid 100 MCG tablet Take 100 mcg by mouth daily   • valACYclovir (VALTREX) 1,000 mg tablet TAKE 2 TABLETS BY MOUTH AND REPEAT DOSE IN 12 HOURS       Objective     /76 (BP Location: Left arm, Patient Position: Sitting, Cuff Size: Standard)   Pulse 77   Temp 98.1 °F (36.7 °C) (Temporal)   Resp 16   Wt 79.8 kg (176 lb)   SpO2 98%   BMI 31.98 kg/m²     Physical Exam  Musculoskeletal:      Comments: Decreased range of motion in lumbar spine. Tenderness right knee with extreme flexion or extension. Some crepitance and clicking.        Jasen Montalvo, DO

## 2023-10-18 ENCOUNTER — TELEPHONE (OUTPATIENT)
Dept: ADMINISTRATIVE | Facility: OTHER | Age: 58
End: 2023-10-18

## 2023-10-18 ENCOUNTER — APPOINTMENT (OUTPATIENT)
Dept: PHYSICAL THERAPY | Facility: CLINIC | Age: 58
End: 2023-10-18
Payer: COMMERCIAL

## 2023-10-18 NOTE — TELEPHONE ENCOUNTER
Upon review of the In Basket request and the patient's chart, initial outreach has been made via fax to facility. Please see Contacts section for details.      Thank you  Tyson Weiner MA

## 2023-10-18 NOTE — TELEPHONE ENCOUNTER
----- Message from Ti Cruz RN sent at 10/17/2023  6:34 PM EDT -----  Regarding: Dexa scan  10/17/23 6:35 PM    Denilson, our patient Shira Pinto has had DEXA Scan completed/performed. Please assist in updating the patient chart by making an External outreach to [facility] located in [address]  Phone: [phone]  Fax: [phone]. The date of service is within the last 2 years.     Thank you,  Ti Cruz RN  [facility]

## 2023-10-18 NOTE — LETTER
Procedure Request Form: DEXA Scan      Date Requested: 10/18/23  Patient: Jaime Billings  Patient : 1965   Referring Provider: Yan Marcano DO    [address] [facility] [address]        Date of Procedure ______________________________       The above patient has informed us that they have completed their   most recent DEXA Scan at your facility. Please complete   this form and attach all corresponding procedure reports/results. Comments ____ DOS within the last 2 years _______________________________  ____________________________________________________________________  ____________________________________________________________________  ____________________________________________________________________    Facility Completing Procedure _________________________________________    Form Completed By (print name) _______________________________________      Signature __________________________________________________________      These reports are needed for  compliance. Please fax this completed form and a copy of the procedure report to our office located at [address] [place] [address] as soon as possible to Fax [phone] maria eugenia Rodriguez [facility]: Phone [phone]    We thank you for your assistance in treating our mutual patient.

## 2023-10-19 NOTE — TELEPHONE ENCOUNTER
Upon review of the In Basket request we were able to locate, review, and update the patient chart as requested for DEXA Scan. Any additional questions or concerns should be emailed to the Practice Liaisons via the appropriate education email address, please do not reply via In Basket.     Thank you  Oumar Felix MA

## 2023-10-23 ENCOUNTER — APPOINTMENT (OUTPATIENT)
Dept: PHYSICAL THERAPY | Facility: CLINIC | Age: 58
End: 2023-10-23
Payer: COMMERCIAL

## 2023-10-26 ENCOUNTER — HOSPITAL ENCOUNTER (OUTPATIENT)
Dept: MRI IMAGING | Facility: HOSPITAL | Age: 58
Discharge: HOME | End: 2023-10-26
Payer: COMMERCIAL

## 2023-10-26 ENCOUNTER — APPOINTMENT (OUTPATIENT)
Dept: PHYSICAL THERAPY | Facility: CLINIC | Age: 58
End: 2023-10-26
Payer: COMMERCIAL

## 2023-10-26 DIAGNOSIS — G89.29 CHRONIC PAIN OF RIGHT KNEE: ICD-10-CM

## 2023-10-26 DIAGNOSIS — M54.42 CHRONIC BILATERAL LOW BACK PAIN WITH BILATERAL SCIATICA: ICD-10-CM

## 2023-10-26 DIAGNOSIS — M25.561 CHRONIC PAIN OF RIGHT KNEE: ICD-10-CM

## 2023-10-26 DIAGNOSIS — M54.41 CHRONIC BILATERAL LOW BACK PAIN WITH BILATERAL SCIATICA: ICD-10-CM

## 2023-10-26 DIAGNOSIS — G89.29 CHRONIC BILATERAL LOW BACK PAIN WITH BILATERAL SCIATICA: ICD-10-CM

## 2023-10-26 PROCEDURE — G1004 CDSM NDSC: HCPCS

## 2023-10-26 PROCEDURE — 72148 MRI LUMBAR SPINE W/O DYE: CPT

## 2023-10-26 PROCEDURE — 73721 MRI JNT OF LWR EXTRE W/O DYE: CPT

## 2023-10-31 DIAGNOSIS — S83.203D COMPLEX TEAR OF MENISCUS OF RIGHT KNEE, UNSPECIFIED MENISCUS, UNSPECIFIED WHETHER OLD OR CURRENT TEAR, SUBSEQUENT ENCOUNTER: Primary | ICD-10-CM

## 2023-11-03 ENCOUNTER — OFFICE VISIT (OUTPATIENT)
Dept: OBGYN CLINIC | Facility: MEDICAL CENTER | Age: 58
End: 2023-11-03
Payer: COMMERCIAL

## 2023-11-03 DIAGNOSIS — G89.29 CHRONIC PAIN OF RIGHT KNEE: Primary | ICD-10-CM

## 2023-11-03 DIAGNOSIS — S83.231A COMPLEX TEAR OF MEDIAL MENISCUS OF RIGHT KNEE AS CURRENT INJURY, INITIAL ENCOUNTER: ICD-10-CM

## 2023-11-03 DIAGNOSIS — M25.561 CHRONIC PAIN OF RIGHT KNEE: Primary | ICD-10-CM

## 2023-11-03 DIAGNOSIS — G89.29 CHRONIC BILATERAL LOW BACK PAIN, UNSPECIFIED WHETHER SCIATICA PRESENT: ICD-10-CM

## 2023-11-03 DIAGNOSIS — M54.50 CHRONIC BILATERAL LOW BACK PAIN, UNSPECIFIED WHETHER SCIATICA PRESENT: ICD-10-CM

## 2023-11-03 DIAGNOSIS — M17.11 PRIMARY OSTEOARTHRITIS OF RIGHT KNEE: ICD-10-CM

## 2023-11-03 PROCEDURE — 99204 OFFICE O/P NEW MOD 45 MIN: CPT | Performed by: EMERGENCY MEDICINE

## 2023-11-03 PROCEDURE — 20610 DRAIN/INJ JOINT/BURSA W/O US: CPT | Performed by: EMERGENCY MEDICINE

## 2023-11-03 RX ORDER — LIDOCAINE HYDROCHLORIDE 10 MG/ML
4 INJECTION, SOLUTION INFILTRATION; PERINEURAL
Status: COMPLETED | OUTPATIENT
Start: 2023-11-03 | End: 2023-11-03

## 2023-11-03 RX ORDER — TRIAMCINOLONE ACETONIDE 40 MG/ML
40 INJECTION, SUSPENSION INTRA-ARTICULAR; INTRAMUSCULAR
Status: COMPLETED | OUTPATIENT
Start: 2023-11-03 | End: 2023-11-03

## 2023-11-03 RX ADMIN — LIDOCAINE HYDROCHLORIDE 4 ML: 10 INJECTION, SOLUTION INFILTRATION; PERINEURAL at 08:00

## 2023-11-03 RX ADMIN — TRIAMCINOLONE ACETONIDE 40 MG: 40 INJECTION, SUSPENSION INTRA-ARTICULAR; INTRAMUSCULAR at 08:00

## 2023-11-03 NOTE — PROGRESS NOTES
Assessment/Plan:    Diagnoses and all orders for this visit:    Chronic pain of right knee  -     Large joint arthrocentesis: R knee    Primary osteoarthritis of right knee  -     Large joint arthrocentesis: R knee    Complex tear of medial meniscus of right knee as current injury, initial encounter  -     Ambulatory referral to Orthopedic Surgery  -     Large joint arthrocentesis: R knee    Chronic bilateral low back pain, unspecified whether sciatica present    I believe most of the symptoms that [place] Graff is experiencing is due to the degenerative changes of the knee including the osteoarthritis. She is not experiencing any significant mechanical symptoms suggestive of a structural block caused by the meniscal flipped fragment seen on MRI. We have provided a corticosteroid injection of the right knee which I believe will help the patient. We did discuss extensively activity modifications for exercise and normal activities of daily living. She may continue Advil as needed. If no significant improvement to consider referral to orthopedic surgeon to discuss further treatment options. Reviewed PCP and PT notes  Reviewed Xrays Right knee, MRIs knee and L spine    Return in about 4 weeks (around 12/1/2023). Subjective:   Patient ID: Joon Couch is a 60 y.o. female. New patient presents referred by PCP Dr. Paola Dawson for chronic right knee pain. She notes diffuse pain anterior and posterior symptoms are generally worse after prolonged sitting she feels a stiffness and pain. She does have some pain of the distal hamstring area. She is also experiencing lower back pain. She has participated in formal physical therapy for her back and her knee. She notes pain with standing and walking but is able to participate in pickleball. She has been taking Advil as needed for her pain symptoms. X-ray and MRI of the right knee were previously obtained and reviewed today.   The patient denies any significant mechanical symptoms of the knee        Review of Systems    The following portions of the patient's chart were reviewed and updated as appropriate: Allergy:    Allergies   Allergen Reactions    Minocycline Other (See Comments)    Prednisone Other (See Comments)     Joint Aches       Tetracyclines & Related Other (See Comments)     Joint aches        Medications:    Current Outpatient Medications:     ALPRAZolam (XANAX) 0.5 mg tablet, TAKE HALF TABLET BY MOUTH 2 TIMES A DAY AS NEEDED FOR ANXIETY, Disp: , Rfl:     Calcium-Vitamins C & D (CALCIUM/C/D PO), Take by mouth, Disp: , Rfl:     Cholecalciferol 50 MCG (2000 UT) CAPS, Take by mouth, Disp: , Rfl:     estradiol (ESTRACE VAGINAL) 0.1 mg/g vaginal cream, Twice weekly, Disp: , Rfl:     fluticasone (FLONASE) 50 mcg/act nasal spray, 1 spray into each nostril, Disp: , Rfl:     L-Lysine 500 MG TABS, Take by mouth daily, Disp: , Rfl:     lisinopril (ZESTRIL) 5 mg tablet, 2.5 mg, Disp: , Rfl:     Multiple Vitamins-Minerals (MULTIVITAL PO), Take by mouth, Disp: , Rfl:     Synthroid 100 MCG tablet, Take 100 mcg by mouth daily, Disp: , Rfl:     valACYclovir (VALTREX) 1,000 mg tablet, TAKE 2 TABLETS BY MOUTH AND REPEAT DOSE IN 12 HOURS, Disp: , Rfl:     Patient Active Problem List   Diagnosis    Hypertension    Vaginal candidiasis       Objective: There were no vitals taken for this visit. Right Knee Exam     Tenderness   The patient is experiencing tenderness in the medial joint line. Range of Motion   The patient has normal right knee ROM. Other   Erythema: absent  Sensation: normal  Swelling: mild            Physical Exam      Neurologic Exam    Large joint arthrocentesis: R knee  Universal Protocol:  Consent: Verbal consent obtained.   Risks and benefits: risks, benefits and alternatives were discussed  Consent given by: patient  Time out: Immediately prior to procedure a "time out" was called to verify the correct patient, procedure, equipment, support staff and site/side marked as required. Timeout called at: 11/3/2023 8:39 AM.  Patient understanding: patient states understanding of the procedure being performed  Test results: test results available and properly labeled  Site marked: the operative site was marked  Patient identity confirmed: verbally with patient  Supporting Documentation  Indications: pain   Procedure Details  Location: knee - R knee  Preparation: Patient was prepped and draped in the usual sterile fashion  Needle size: 22 G  Ultrasound guidance: no  Approach: anterolateral  Medications administered: 4 mL lidocaine 1 %; 40 mg triamcinolone acetonide 40 mg/mL    Patient tolerance: patient tolerated the procedure well with no immediate complications  Dressing:  Sterile dressing applied    No erythema of knee(s)        I have personally reviewed the written report of the pertinent studies. MRI RIGHT KNEE     INDICATION:   M25.561: Pain in right knee  G89.29: Other chronic pain. Chronic right knee pain, clicking. COMPARISON: Right knee plain films on 7/10/2023. TECHNIQUE: Multiplanar/multisequence MR of the right knee was performed. FINDINGS:     SUBCUTANEOUS TISSUES: Normal     JOINT EFFUSION: None. BAKER'S CYST: There is a small Baker's cyst.     MENISCI: Large complex tear of the medial meniscus posterior horn and body with flipped meniscal fragment into the inferior medial gutter (series 6 images 4-10, and series 3 images 11-15.)     Lateral meniscus is intact. CRUCIATE LIGAMENTS: Intact. EXTENSOR APPARATUS: Intact. COLLATERAL LIGAMENTS: Intact. ARTICULAR SURFACES:  Medial compartment: Mild osteoarthritis. Diffuse partial-thickness cartilage loss. Lateral compartment: Normal.  Patellofemoral compartment: Normal.     BONES: Normal.     MUSCULATURE:  Intact.      IMPRESSION:     Large complex tear of the medial meniscus posterior horn and body with flipped meniscal fragment into the inferior medial gutter (series 6 images 4-10, and series 3 images 11-15.)     Mild medial tibiofemoral compartment osteoarthritis. MRI LUMBAR SPINE WITHOUT CONTRAST     INDICATION: M54.42: Lumbago with sciatica, left side  M54.41: Lumbago with sciatica, right side  G89.29: Other chronic pain. Low back pain, symptoms persist with > 6wks conservative treatment, radiates down left buttock, nki     COMPARISON:  None. TECHNIQUE:  Multiplanar, multisequence imaging of the lumbar spine was performed. .        IMAGE QUALITY:  Diagnostic     FINDINGS:     VERTEBRAL BODIES:  There are 5 lumbar type vertebral bodies. Mild levoscoliosis of lumbar spine. No spondylolysis or spondylolisthesis. No compression fracture. Type I Modic endplate change at anterior inferior corner endplate of L2. Otherwise, normal bone marrow signal.     SACRUM:  Normal signal within the sacrum. No evidence of insufficiency or stress fracture. DISTAL CORD AND CONUS:  Normal size and signal within the distal cord and conus. PARASPINAL SOFT TISSUES:  Paraspinal soft tissues are unremarkable. LOWER THORACIC DISC SPACES:  Mild noncompressive lower thoracic degenerative change. LUMBAR DISC SPACES: Multilevel tiny osteophytes, small intravertebral herniations, mild disc height loss, and facet arthropathy. L1-L2: Mild diffuse disc bulge. No significant canal stenosis or foraminal narrowing. L2-L3: Mild diffuse disc bulge. Mild facet arthropathy, trace facet joint effusions. No significant canal stenosis or foraminal narrowing. L3-L4: Mild diffuse disc bulge. Mild facet arthropathy, trace facet joint effusions. No significant canal stenosis or foraminal narrowing. L4-L5: Mild diffuse disc bulge. Mild facet arthropathy, prominent bilateral facet joint effusions (left greater than right). No significant canal stenosis or foraminal narrowing.      L5-S1: Mild-to-moderate facet arthropathy, trace bilateral facet joint effusions, small synovial cyst posterior to left L5 inferior articular process. No significant canal stenosis or foraminal narrowing. OTHER FINDINGS:  None. IMPRESSION:     Multilevel degenerative changes of lumbar spine, as detailed above. No significant canal stenosis or foraminal narrowing.                   Past Medical History:   Diagnosis Date    Hypothyroidism     Rosacea        Past Surgical History:   Procedure Laterality Date    BLADDER SURGERY  2018    TRIGGER FINGER RELEASE Right 02/27/2023       Social History     Socioeconomic History    Marital status: /Civil Union     Spouse name: Not on file    Number of children: Not on file    Years of education: Not on file    Highest education level: Not on file   Occupational History    Not on file   Tobacco Use    Smoking status: Never     Passive exposure: Never    Smokeless tobacco: Never   Vaping Use    Vaping Use: Never used   Substance and Sexual Activity    Alcohol use: Not Currently    Drug use: Never    Sexual activity: Not on file   Other Topics Concern    Not on file   Social History Narrative    Not on file     Social Determinants of Health     Financial Resource Strain: Not on file   Food Insecurity: Not on file   Transportation Needs: Not on file   Physical Activity: Not on file   Stress: Not on file   Social Connections: Not on file   Intimate Partner Violence: Not on file   Housing Stability: Not on file       Family History   Problem Relation Age of Onset    Hypothyroidism Mother     Anemia Mother     Lung cancer Mother     Hypertension Mother     Diabetes Brother     Emphysema Brother     Pancreatitis Brother

## 2023-11-03 NOTE — PATIENT INSTRUCTIONS
You may use Advil (ibuprofen) 400-600mg every 6 hours or at least twice per day OR Aleve (naproxen) 250-500mg every 12 hours as needed for pain and inflammation. You may also take Tylenol 500mg every 4-6 hours as needed OR max 1,000mg per dose up to 3 times per day for a total of 3,000mg per day  Check with your primary care physician to see if these medications are safe to take and to make sure they do not interfere with your other medications and medical issues. In order to minimize further degenerative changes and pain from arthritis we recommend maintaining an active lifestyle and continually trying to maintain a healthy weight / BMI (Body Mass Index). If you are interested we can refer you to Weight Management to help with weight loss. I recommended avoiding any tobacco use. On rainy days and cold days you may have worsening pain than baseline. [facility] [facility]. org for more information     Steroid Joint Injection   AMBULATORY CARE:   What you need to know about steroid joint injection:  A steroid joint injection is a procedure to inject steroid medicine into a joint. Steroid medicine decreases pain and inflammation. The injection may also contain an anesthetic (numbing medicine) to decrease pain. It may be done to treat conditions such as arthritis, gout, or carpal tunnel syndrome. The injections may be given in your knee, ankle, shoulder, elbow, wrist, or ankle. How to prepare for steroid joint injection:  Your healthcare provider will talk to you about how to prepare for this procedure. He will tell you what medicines to take or not take on the day of your procedure. You may need to stop taking blood thinners several days before your procedure. What will happen during steroid joint injection:  You may be given local anesthesia to numb the area where the injection will be given. With local anesthesia, you may still feel pressure during the procedure, but you should not feel any pain.  Your healthcare provider may use ultrasound or fluoroscopy (a type of x-ray) to guide the needle to the right area. He will then inject the steroid into your joint. A bandage will be placed on the injection site. What will happen after steroid joint injection:  You may have redness, warmth, or sweating in your face and chest right after the steroid injection. Steroids can affect blood sugar levels. If you have diabetes, you should check your blood sugars closely in the first 24 hours after your procedure. Risks of steroid joint injection:  You may get an infection in your joint. The injection may also cause more pain during the first 24 to 36 hours. You may need more than one injection to feel pain relief. The skin near the injection site may be damaged and become discolored or indented. This can happen if the steroid is placed too close to your skin. A tendon near the injection site may rupture or a nerve can be damaged. Contact your healthcare provider if:   You have fever or chills. You have redness or swelling in the injection site. You have more pain than usual in your joint for more than 72 hours. You have questions or concerns about your condition or care. Medicines:   Pain medicine  may be given. Ask how to take this medicine safely. Take your medicine as directed. Contact your healthcare provider if you think your medicine is not helping or if you have side effects. Tell him or her if you are allergic to any medicine. Keep a list of the medicines, vitamins, and herbs you take. Include the amounts, and when and why you take them. Bring the list or the pill bottles to follow-up visits. Carry your medicine list with you in case of an emergency. Self-care:   Leave the bandage on for 8 to 12 hours. Care for your wound as directed. Rest the area  as directed. You may need to decrease weight on certain joints, such as the knee, for a period of time.  Ask when you can return to your daily activities. Elevate  your limb where the steroid injection was given. Elevate the limb above the level of your heart as often as you can. This will help decrease swelling and pain. Prop your limb on pillows or blankets to keep it elevated comfortably. Apply ice  on your joint for 15 to 20 minutes every hour or as directed. Use an ice pack, or put crushed ice in a plastic bag. Cover it with a towel. Ice helps prevent tissue damage and decreases swelling and pain. Follow up with your healthcare provider as directed:  Write down your questions so you remember to ask them during your visits. © 2017 [address] [facility] Information is for End User's use only and may not be sold, redistributed or otherwise used for commercial purposes. All illustrations and images included in CareNotes® are the copyrighted property of [employer] or [employer]. The above information is an  only. It is not intended as medical advice for individual conditions or treatments. Talk to your doctor, nurse or pharmacist before following any medical regimen to see if it is safe and effective for you. Arthritis   AMBULATORY CARE:   Arthritis  is a disease that causes inflammation in one or more joints. There are many types of arthritis, such as osteoarthritis, rheumatoid arthritis, and septic arthritis. Some types cause inflammation in the joints. Other types wear away the cartilage between joints. This makes the bones of the joint rub together when you move the joint. Your symptoms may be constant, or symptoms may come and go. Arthritis often gets worse over time and can cause permanent joint damage.   Common signs and symptoms of arthritis:   Pain, swelling, or stiffness in the joint    Limited range of motion in the joint    Warmth or redness over the joint    Tenderness when you touch the joint    Stiff joints in the morning that loosen with movement    A creaking or grinding sound when you move the joint    Fever  Seek care immediately if:   You have a fever and severe joint pain or swelling. You cannot move the affected joint. You have severe joint pain you cannot tolerate. Contact your healthcare provider if:   Your pain or swelling does not get better with treatment. You have questions or concerns about your condition or care. Treatment  will depend on the type of arthritis you have and if it is severe. You may need any of the following:  Acetaminophen  decreases pain and fever. It is available without a doctor's order. Ask how much to take and how often to take it. Follow directions. Acetaminophen can cause liver damage if not taken correctly. NSAIDs , such as ibuprofen, help decrease swelling, pain, and fever. This medicine is available with or without a doctor's order. NSAIDs can cause stomach bleeding or kidney problems in certain people. If you take blood thinner medicine, always ask your healthcare provider if NSAIDs are safe for you. Always read the medicine label and follow directions. Steroid medicine  helps reduce swelling and pain. Surgery  may be needed to repair or replace a damaged joint. Manage arthritis:   Rest your painful joint so it can heal.  Your healthcare provider may recommend crutches or a walker if the affected joint is in a leg. Apply ice or heat to the joint. Both can help decrease swelling and pain. Ice may also help prevent tissue damage. Use an ice pack, or put crushed ice in a plastic bag. Cover it with a towel and place it on your joint for 15 to 20 minutes every hour or as directed. You can apply heat for 20 minutes every 2 hours. Heat treatment includes hot packs or heat lamps. Elevate your joint. Elevation helps reduce swelling and pain. Raise your joint above the level of your heart as often as you can. Prop your painful joint on pillows to keep it above your heart comfortably.     Go to physical or occupational therapy as directed. A physical therapist can teach you exercises to improve flexibility and range of motion. You may also be shown non-weight-bearing exercises that are safe for your joints, such as swimming. Exercise can help keep your joints flexible and reduce pain. An occupational therapist can help you learn to do your daily activities when your joints are stiff or sore. Maintain a healthy weight. Extra weight puts increased pressure on your joints. Ask your healthcare provider what you should weigh. If you need to lose weight, he can help you create a weight loss program. Weight loss can help reduce pain and increase your ability to do your activities. The amount of exercise you do may vary each day, depending on your symptoms. Wear flat or low-heeled shoes. This will help decrease pain and reduce pressure on your ankle, knee, and hip joints. Use support devices as directed. You may be given splints to wear on your hands to help your joints rest and to decrease inflammation. While you sleep, use a pillow that is firm enough to support your neck and head. Other equipment  that may help you move and prevent falls:  Orthotic shoes or insoles  help support your feet when you walk. Crutches, a cane, or a walker  may help decrease your risk for falling. They also decrease stress on affected joints. Devices to prevent falls  include raised toilet seats and bathtub bars to help you get up from sitting. Handrails can be placed in areas where you need balance and support. Follow up with your healthcare provider or rheumatologist as directed:  Write down your questions so you remember to ask them during your visits. © 2017 [facility] Information is for End User's use only and may not be sold, redistributed or otherwise used for commercial purposes. All illustrations and images included in CareNotes® are the copyrighted property of [employer] or Elie [employer].   The above information is an  only. It is not intended as medical advice for individual conditions or treatments. Talk to your doctor, nurse or pharmacist before following any medical regimen to see if it is safe and effective for you.

## 2023-11-07 ENCOUNTER — TELEPHONE (OUTPATIENT)
Age: 58
End: 2023-11-07

## 2023-11-07 NOTE — TELEPHONE ENCOUNTER
Caller: Patient     Doctor: Arpit Briseno    Reason for call: Patient had a corticosteroid injection of the right knee on 11/3/2023  and is now getting awful heat flashes that last hours. When she isn't having a heat flash she is freezing. She is not sure if this a reaction to the injection and wanted to make you aware.     Call back#: [phone]

## 2023-11-15 ENCOUNTER — OFFICE VISIT (OUTPATIENT)
Dept: PODIATRY | Facility: CLINIC | Age: 58
End: 2023-11-15
Payer: COMMERCIAL

## 2023-11-15 VITALS — HEIGHT: 62 IN | BODY MASS INDEX: 32.02 KG/M2 | WEIGHT: 174 LBS | RESPIRATION RATE: 18 BRPM

## 2023-11-15 DIAGNOSIS — M72.2 PLANTAR FASCIITIS: Primary | ICD-10-CM

## 2023-11-15 DIAGNOSIS — D36.10 NEUROMA: ICD-10-CM

## 2023-11-15 PROCEDURE — 99202 OFFICE O/P NEW SF 15 MIN: CPT | Performed by: PODIATRIST

## 2023-11-15 NOTE — PROGRESS NOTES
Assessment/Plan:    Explained to patient that orthotics may help relieve some of her back pain and arch pain. It is unlikely that insurance will cover this but we will look into it. Patient will be contacted for orthotic scheduling in [facility]. No problem-specific Assessment & Plan notes found for this encounter. Diagnoses and all orders for this visit:    Plantar fasciitis  -     Device Prior Authorization; Future    Neuroma  -     Ambulatory Referral to Podiatry  -     Device Prior Authorization; Future          Subjective:      Patient ID: Óscar Springer is a 60 y.o. female. HPI    Patient, a 69-year-old female in apparent good health presents desirous of having new pair of functional orthotics made. Patient states that she wore orthotics years back. They were prescribed for a neuroma. Patient has not had the orthotics for the past year but still no longer has pain in her feet from a neuroma. She has right arch pain within the plantar fascia but this pain is intermittent. Patient relates lower back pain as well as knee pain. She hopes that having orthotics again made will aid in her body discomfort. The following portions of the patient's history were reviewed and updated as appropriate: allergies, current medications, past family history, past medical history, past social history, past surgical history, and problem list.    Review of Systems   Gastrointestinal: Negative. Musculoskeletal: Negative. Psychiatric/Behavioral: Negative. Objective:      Resp 18   Ht 5' 2" (1.575 m)   Wt 78.9 kg (174 lb)   BMI 31.83 kg/m²          Physical Exam  Constitutional:       Appearance: Normal appearance. Cardiovascular:      Pulses: Normal pulses. Musculoskeletal:      Comments: Mild discomfort present within the right arch. Skin:     General: Skin is warm. Neurological:      General: No focal deficit present.       Mental Status: She is oriented to person, place, and time.

## 2023-11-16 ENCOUNTER — TELEPHONE (OUTPATIENT)
Dept: PODIATRY | Facility: CLINIC | Age: 58
End: 2023-11-16

## 2023-12-04 ENCOUNTER — OFFICE VISIT (OUTPATIENT)
Dept: OBGYN CLINIC | Facility: MEDICAL CENTER | Age: 58
End: 2023-12-04
Payer: COMMERCIAL

## 2023-12-04 VITALS
DIASTOLIC BLOOD PRESSURE: 76 MMHG | HEART RATE: 77 BPM | SYSTOLIC BLOOD PRESSURE: 118 MMHG | BODY MASS INDEX: 32.2 KG/M2 | WEIGHT: 175 LBS | HEIGHT: 62 IN

## 2023-12-04 DIAGNOSIS — S83.231A COMPLEX TEAR OF MEDIAL MENISCUS OF RIGHT KNEE AS CURRENT INJURY, INITIAL ENCOUNTER: ICD-10-CM

## 2023-12-04 DIAGNOSIS — M25.561 CHRONIC PAIN OF RIGHT KNEE: Primary | ICD-10-CM

## 2023-12-04 DIAGNOSIS — G89.29 CHRONIC PAIN OF RIGHT KNEE: Primary | ICD-10-CM

## 2023-12-04 DIAGNOSIS — M17.11 PRIMARY OSTEOARTHRITIS OF RIGHT KNEE: ICD-10-CM

## 2023-12-04 PROCEDURE — 99213 OFFICE O/P EST LOW 20 MIN: CPT | Performed by: EMERGENCY MEDICINE

## 2023-12-04 NOTE — PROGRESS NOTES
Assessment/Plan:    Diagnoses and all orders for this visit:    Chronic pain of right knee    Primary osteoarthritis of right knee    Complex tear of medial meniscus of right knee as current injury, initial encounter    Symptoms improved s/p CSI. Reviewed prior Xray and MRI. Will continue to monitor her symptoms, if mechanical symptoms worsening will refer to Ortho surgeon to discuss meniscus tear with flipped fragment    Return if symptoms worsen or fail to improve. Subjective:   Patient ID: Daphne Yeager is a 60 y.o. female. Patient returns s/p Right knee CSI last eval with improvement of symptoms, she has only experienced 1 episode of painful popping, pain present with walking. Still swimming and playing pickleball 2/week, better with stairs. Pre CSI max pain /10, Post-CSI pain 2-3/10 with walking. Taking IBU 200mg which helps    Initial note:  New patient presents referred by PCP Dr. Oliverio Jensen for chronic right knee pain. She notes diffuse pain anterior and posterior symptoms are generally worse after prolonged sitting she feels a stiffness and pain. She does have some pain of the distal hamstring area. She is also experiencing lower back pain. She has participated in formal physical therapy for her back and her knee. She notes pain with standing and walking but is able to participate in pickleball. She has been taking Advil as needed for her pain symptoms. X-ray and MRI of the right knee were previously obtained and reviewed today. The patient denies any significant mechanical symptoms of the knee        Review of Systems    The following portions of the patient's chart were reviewed and updated as appropriate:    Allergy:    Allergies   Allergen Reactions    Minocycline Other (See Comments)    Prednisone Other (See Comments)     Joint Aches       Tetracyclines & Related Other (See Comments)     Joint aches        Medications:    Current Outpatient Medications:     ALPRAZolam (XANAX) 0.5 mg tablet, TAKE HALF TABLET BY MOUTH 2 TIMES A DAY AS NEEDED FOR ANXIETY, Disp: , Rfl:     Calcium-Vitamins C & D (CALCIUM/C/D PO), Take by mouth, Disp: , Rfl:     Cholecalciferol 50 MCG (2000 UT) CAPS, Take by mouth, Disp: , Rfl:     estradiol (ESTRACE VAGINAL) 0.1 mg/g vaginal cream, Twice weekly, Disp: , Rfl:     fluticasone (FLONASE) 50 mcg/act nasal spray, 1 spray into each nostril, Disp: , Rfl:     L-Lysine 500 MG TABS, Take by mouth daily, Disp: , Rfl:     lisinopril (ZESTRIL) 5 mg tablet, 2.5 mg, Disp: , Rfl:     Multiple Vitamins-Minerals (MULTIVITAL PO), Take by mouth, Disp: , Rfl:     Synthroid 100 MCG tablet, Take 100 mcg by mouth daily, Disp: , Rfl:     valACYclovir (VALTREX) 1,000 mg tablet, TAKE 2 TABLETS BY MOUTH AND REPEAT DOSE IN 12 HOURS, Disp: , Rfl:     Patient Active Problem List   Diagnosis    Hypertension    Vaginal candidiasis       Objective:  /76   Pulse 77   Ht 5' 2" (1.575 m)   Wt 79.4 kg (175 lb)   BMI 32.01 kg/m²     Right Knee Exam     Range of Motion   The patient has normal right knee ROM. Other   Erythema: absent            Physical Exam      Neurologic Exam    Procedures    I have personally reviewed the written report of the pertinent studies. MRI Right Knee  IMPRESSION:     Large complex tear of the medial meniscus posterior horn and body with flipped meniscal fragment into the inferior medial gutter (series 6 images 4-10, and series 3 images 11-15.)     Mild medial tibiofemoral compartment osteoarthritis.         MRI L spine            Past Medical History:   Diagnosis Date    Hypothyroidism     Rosacea        Past Surgical History:   Procedure Laterality Date    BLADDER SURGERY  2018    TRIGGER FINGER RELEASE Right 02/27/2023       Social History     Socioeconomic History    Marital status: /Civil Union     Spouse name: Not on file    Number of children: Not on file    Years of education: Not on file    Highest education level: Not on file Occupational History    Not on file   Tobacco Use    Smoking status: Never     Passive exposure: Never    Smokeless tobacco: Never   Vaping Use    Vaping Use: Never used   Substance and Sexual Activity    Alcohol use: Not Currently    Drug use: Never    Sexual activity: Not on file   Other Topics Concern    Not on file   Social History Narrative    Not on file     Social Determinants of Health     Financial Resource Strain: Not on file   Food Insecurity: Not on file   Transportation Needs: Not on file   Physical Activity: Not on file   Stress: Not on file   Social Connections: Not on file   Intimate Partner Violence: Not on file   Housing Stability: Not on file       Family History   Problem Relation Age of Onset    Hypothyroidism Mother     Anemia Mother     Lung cancer Mother     Hypertension Mother     Diabetes Brother     Emphysema Brother     Pancreatitis Brother

## 2023-12-04 NOTE — PATIENT INSTRUCTIONS
You may use Advil (ibuprofen) 400-600mg every 6 hours or at least twice per day (OR Aleve (naproxen) 250-500mg every 12 hours as needed for pain and inflammation). You may also take Tylenol (acetaminophen) together with Advil (ibuprofen) or Aleve (naproxen) as this is safe and can help decrease your pain levels. The dosing for Tylenol is 500mg every 4-6 hours as needed OR max 1,000mg per dose up to 3 times per day for a total of 3,000mg per day  *Check with your primary care physician to see if these medications are safe to take and to make sure they do not interfere with your other medications and medical issues.

## 2023-12-22 ENCOUNTER — OFFICE VISIT (OUTPATIENT)
Dept: URGENT CARE | Facility: MEDICAL CENTER | Age: 58
End: 2023-12-22
Payer: COMMERCIAL

## 2023-12-22 VITALS
TEMPERATURE: 99.7 F | OXYGEN SATURATION: 98 % | RESPIRATION RATE: 18 BRPM | DIASTOLIC BLOOD PRESSURE: 90 MMHG | SYSTOLIC BLOOD PRESSURE: 120 MMHG | HEART RATE: 95 BPM

## 2023-12-22 DIAGNOSIS — R39.9 UTI SYMPTOMS: ICD-10-CM

## 2023-12-22 DIAGNOSIS — R50.9 FEVER, UNSPECIFIED FEVER CAUSE: ICD-10-CM

## 2023-12-22 DIAGNOSIS — N30.01 ACUTE CYSTITIS WITH HEMATURIA: Primary | ICD-10-CM

## 2023-12-22 LAB
SL AMB  POCT GLUCOSE, UA: NEGATIVE
SL AMB LEUKOCYTE ESTERASE,UA: ABNORMAL
SL AMB POCT BILIRUBIN,UA: NEGATIVE
SL AMB POCT BLOOD,UA: ABNORMAL
SL AMB POCT CLARITY,UA: CLEAR
SL AMB POCT COLOR,UA: ABNORMAL
SL AMB POCT KETONES,UA: NEGATIVE
SL AMB POCT NITRITE,UA: NEGATIVE
SL AMB POCT PH,UA: 5
SL AMB POCT SPECIFIC GRAVITY,UA: 1.01
SL AMB POCT URINE PROTEIN: NEGATIVE
SL AMB POCT UROBILINOGEN: 0.2

## 2023-12-22 PROCEDURE — 81002 URINALYSIS NONAUTO W/O SCOPE: CPT | Performed by: ORTHOPAEDIC SURGERY

## 2023-12-22 PROCEDURE — 87086 URINE CULTURE/COLONY COUNT: CPT | Performed by: ORTHOPAEDIC SURGERY

## 2023-12-22 PROCEDURE — 99213 OFFICE O/P EST LOW 20 MIN: CPT | Performed by: ORTHOPAEDIC SURGERY

## 2023-12-22 RX ORDER — FLUCONAZOLE 150 MG/1
150 TABLET ORAL ONCE
Qty: 1 TABLET | Refills: 0 | Status: SHIPPED | OUTPATIENT
Start: 2023-12-22 | End: 2023-12-22

## 2023-12-22 RX ORDER — CEPHALEXIN 500 MG/1
500 CAPSULE ORAL EVERY 12 HOURS SCHEDULED
Qty: 14 CAPSULE | Refills: 0 | Status: SHIPPED | OUTPATIENT
Start: 2023-12-22 | End: 2023-12-29

## 2023-12-23 NOTE — PATIENT INSTRUCTIONS
Take prescription antibiotic as prescribed.   Your urine sample was sent to our labs for culture. The office will contact you following results to either continue your current antibiotics or change to a different antibiotic that will work better  You should stay properly hydrated and frequently urinate to help flush out the infection  Warm compresses for abdominal discomfort  Follow up with your PCP in 3-5 days  Proceed to ER if your symptoms worsen  If you experience worsening abdominal/back pain, fever/chills, bloody urination

## 2023-12-24 LAB — BACTERIA UR CULT: NORMAL

## 2023-12-24 NOTE — PROGRESS NOTES
[facility]        NAME: Apple King is a 58 y.o. female  : 1965    MRN: [id number]  DATE: 2023  TIME: 8:09 AM    Assessment and Plan   Acute cystitis with hematuria [N30.01]  1. Acute cystitis with hematuria  cephalexin (KEFLEX) 500 mg capsule      2. UTI symptoms  POCT urine dip    Urine culture    fluconazole (DIFLUCAN) 150 mg tablet      3. Fever, unspecified fever cause  POCT urine dip        POCT urine dip reviewed and discussed with the patient.     Patient Instructions     Take prescription antibiotic as prescribed.   Your urine sample was sent to our labs for culture. The office will contact you following results to either continue your current antibiotics or change to a different antibiotic that will work better  You should stay properly hydrated and frequently urinate to help flush out the infection  Warm compresses for abdominal discomfort  Follow up with your PCP in 3-5 days  Proceed to ER if your symptoms worsen  If you experience worsening abdominal/back pain, fever/chills, bloody urination     Chief Complaint     Chief Complaint   Patient presents with    Possible UTI     Pt reports fever, stinging with urination, HA, and fullness in lower abdominal area x2 days.     Fever         History of Present Illness       58-year-old female presents urgent care for evaluation of abdominal cramping, burning sensation with urination.  Patient notes symptoms began about 2 days ago with a fever, and then a few hours ago the burning started.  She did test for COVID at home which was negative.  Patient denies any back or flank pain.  She denies noticing any blood in her urine.  Her last UTI was about 2 years ago, uncomplicated.  She does have a past surgical history of a bladder lift.        Review of Systems   Review of Systems   Constitutional:  Positive for fever. Negative for chills.   HENT:  Negative for ear pain and sore throat.    Eyes:  Negative for pain and visual  disturbance.   Respiratory:  Negative for cough and shortness of breath.    Cardiovascular:  Negative for chest pain and palpitations.   Gastrointestinal:  Negative for abdominal pain, diarrhea, nausea and vomiting.   Genitourinary:  Positive for dysuria. Negative for flank pain, frequency, hematuria and urgency.   Musculoskeletal:  Negative for arthralgias and back pain.   Skin:  Negative for color change and rash.   Neurological:  Negative for seizures and syncope.   All other systems reviewed and are negative.        Current Medications       Current Outpatient Medications:     ALPRAZolam (XANAX) 0.5 mg tablet, TAKE HALF TABLET BY MOUTH 2 TIMES A DAY AS NEEDED FOR ANXIETY, Disp: , Rfl:     Calcium-Vitamins C & D (CALCIUM/C/D PO), Take by mouth, Disp: , Rfl:     cephalexin (KEFLEX) 500 mg capsule, Take 1 capsule (500 mg total) by mouth every 12 (twelve) hours for 7 days, Disp: 14 capsule, Rfl: 0    Cholecalciferol 50 MCG (2000 UT) CAPS, Take by mouth, Disp: , Rfl:     estradiol (ESTRACE VAGINAL) 0.1 mg/g vaginal cream, Twice weekly, Disp: , Rfl:     fluticasone (FLONASE) 50 mcg/act nasal spray, 1 spray into each nostril, Disp: , Rfl:     L-Lysine 500 MG TABS, Take by mouth daily, Disp: , Rfl:     lisinopril (ZESTRIL) 5 mg tablet, 2.5 mg, Disp: , Rfl:     Multiple Vitamins-Minerals (MULTIVITAL PO), Take by mouth, Disp: , Rfl:     Synthroid 100 MCG tablet, Take 100 mcg by mouth daily, Disp: , Rfl:     valACYclovir (VALTREX) 1,000 mg tablet, TAKE 2 TABLETS BY MOUTH AND REPEAT DOSE IN 12 HOURS, Disp: , Rfl:     Current Allergies     Allergies as of 12/22/2023 - Reviewed 12/22/2023   Allergen Reaction Noted    Minocycline Other (See Comments) 08/10/2022    Prednisone Other (See Comments) 08/10/2022    Tetracyclines & related Other (See Comments) 10/13/2022            The following portions of the patient's history were reviewed and updated as appropriate: allergies, current medications, past family history, past  medical history, past social history, past surgical history and problem list.     Past Medical History:   Diagnosis Date    Hypothyroidism     Rosacea        Past Surgical History:   Procedure Laterality Date    BLADDER SURGERY  2018    TRIGGER FINGER RELEASE Right 02/27/2023       Family History   Problem Relation Age of Onset    Hypothyroidism Mother     Anemia Mother     Lung cancer Mother     Hypertension Mother     Diabetes Brother     Emphysema Brother     Pancreatitis Brother          Medications have been verified.        Objective   /90   Pulse 95   Temp 99.7 °F (37.6 °C) (Tympanic)   Resp 18   SpO2 98%        Physical Exam     Physical Exam  Vitals and nursing note reviewed.   Constitutional:       Appearance: Normal appearance.   HENT:      Head: Normocephalic and atraumatic.      Nose: Nose normal.      Mouth/Throat:      Pharynx: Oropharynx is clear.   Eyes:      Extraocular Movements: Extraocular movements intact.      Pupils: Pupils are equal, round, and reactive to light.   Cardiovascular:      Rate and Rhythm: Normal rate and regular rhythm.      Pulses: Normal pulses.   Pulmonary:      Effort: Pulmonary effort is normal. No respiratory distress.      Breath sounds: Normal breath sounds.   Abdominal:      Palpations: Abdomen is soft.      Tenderness: There is no right CVA tenderness or left CVA tenderness.   Musculoskeletal:      Cervical back: Normal range of motion.   Skin:     General: Skin is warm and dry.      Capillary Refill: Capillary refill takes less than 2 seconds.   Neurological:      General: No focal deficit present.      Mental Status: She is alert and oriented to person, place, and time.   Psychiatric:         Mood and Affect: Mood normal.         Behavior: Behavior normal.

## 2023-12-27 ENCOUNTER — TELEPHONE (OUTPATIENT)
Dept: FAMILY MEDICINE CLINIC | Facility: CLINIC | Age: 58
End: 2023-12-27

## 2023-12-27 NOTE — TELEPHONE ENCOUNTER
Would recommend she just finish out her antibiotics.  Urine culture done at urgent care did not show a full-blown infection.  May just take a few more days until symptoms subside.

## 2023-12-27 NOTE — TELEPHONE ENCOUNTER
Patient seen at Urgent Care 12/22/23 for UTI.  Patient is 5.5 days into her antibiotic. She had some pressure yesterday 12/26/23, and has been having some leakage. Patient denies, fever, urgency, painful urination.     Patient concern if she needs a different antibiotic or should she wait it out until she completes all her antibiotic?

## 2024-01-17 ENCOUNTER — RA CDI HCC (OUTPATIENT)
Dept: OTHER | Facility: HOSPITAL | Age: 59
End: 2024-01-17

## 2024-01-18 ENCOUNTER — APPOINTMENT (OUTPATIENT)
Dept: LAB | Facility: CLINIC | Age: 59
End: 2024-01-18
Payer: COMMERCIAL

## 2024-01-18 DIAGNOSIS — E03.9 HYPOTHYROIDISM, UNSPECIFIED TYPE: ICD-10-CM

## 2024-01-18 DIAGNOSIS — E78.2 MIXED HYPERLIPIDEMIA: ICD-10-CM

## 2024-01-18 LAB
ALBUMIN SERPL BCP-MCNC: 4.3 G/DL (ref 3.5–5)
ALP SERPL-CCNC: 64 U/L (ref 34–104)
ALT SERPL W P-5'-P-CCNC: 18 U/L (ref 7–52)
ANION GAP SERPL CALCULATED.3IONS-SCNC: 6 MMOL/L
AST SERPL W P-5'-P-CCNC: 21 U/L (ref 13–39)
BILIRUB SERPL-MCNC: 0.65 MG/DL (ref 0.2–1)
BUN SERPL-MCNC: 13 MG/DL (ref 5–25)
CALCIUM SERPL-MCNC: 9.5 MG/DL (ref 8.4–10.2)
CHLORIDE SERPL-SCNC: 102 MMOL/L (ref 96–108)
CHOLEST SERPL-MCNC: 223 MG/DL
CO2 SERPL-SCNC: 27 MMOL/L (ref 21–32)
CREAT SERPL-MCNC: 0.76 MG/DL (ref 0.6–1.3)
GFR SERPL CREATININE-BSD FRML MDRD: 86 ML/MIN/1.73SQ M
GLUCOSE P FAST SERPL-MCNC: 93 MG/DL (ref 65–99)
HDLC SERPL-MCNC: 68 MG/DL
LDLC SERPL CALC-MCNC: 138 MG/DL (ref 0–100)
POTASSIUM SERPL-SCNC: 4.1 MMOL/L (ref 3.5–5.3)
PROT SERPL-MCNC: 7.3 G/DL (ref 6.4–8.4)
SODIUM SERPL-SCNC: 135 MMOL/L (ref 135–147)
T4 FREE SERPL-MCNC: 1.36 NG/DL (ref 0.61–1.12)
TRIGL SERPL-MCNC: 85 MG/DL
TSH SERPL DL<=0.05 MIU/L-ACNC: 0.29 UIU/ML (ref 0.45–4.5)

## 2024-01-18 PROCEDURE — 80053 COMPREHEN METABOLIC PANEL: CPT

## 2024-01-18 PROCEDURE — 80061 LIPID PANEL: CPT

## 2024-01-18 PROCEDURE — 84443 ASSAY THYROID STIM HORMONE: CPT

## 2024-01-22 ENCOUNTER — PROCEDURE VISIT (OUTPATIENT)
Dept: PODIATRY | Facility: CLINIC | Age: 59
End: 2024-01-22
Payer: COMMERCIAL

## 2024-01-22 VITALS — RESPIRATION RATE: 18 BRPM | HEIGHT: 62 IN | WEIGHT: 177 LBS | BODY MASS INDEX: 32.57 KG/M2

## 2024-01-22 DIAGNOSIS — M72.2 PLANTAR FASCIITIS: ICD-10-CM

## 2024-01-22 DIAGNOSIS — D36.10 NEUROMA: Primary | ICD-10-CM

## 2024-01-22 PROCEDURE — L3000 FT INSERT UCB BERKELEY SHELL: HCPCS | Performed by: PODIATRIST

## 2024-01-22 NOTE — PROGRESS NOTES
Patient casted for functional orthotics per [facility].  Heels posted to vertical.  Metatarsal pads applied to each orthotic at 1/8 inch.

## 2024-01-24 ENCOUNTER — OFFICE VISIT (OUTPATIENT)
Dept: FAMILY MEDICINE CLINIC | Facility: CLINIC | Age: 59
End: 2024-01-24
Payer: COMMERCIAL

## 2024-01-24 VITALS
WEIGHT: 176.4 LBS | TEMPERATURE: 97.5 F | SYSTOLIC BLOOD PRESSURE: 120 MMHG | DIASTOLIC BLOOD PRESSURE: 88 MMHG | HEIGHT: 63 IN | OXYGEN SATURATION: 99 % | BODY MASS INDEX: 31.25 KG/M2 | HEART RATE: 88 BPM

## 2024-01-24 DIAGNOSIS — E03.9 ACQUIRED HYPOTHYROIDISM: ICD-10-CM

## 2024-01-24 DIAGNOSIS — F41.9 ANXIETY: ICD-10-CM

## 2024-01-24 DIAGNOSIS — E78.2 MIXED HYPERLIPIDEMIA: ICD-10-CM

## 2024-01-24 DIAGNOSIS — I10 HYPERTENSION, UNSPECIFIED TYPE: Primary | ICD-10-CM

## 2024-01-24 PROBLEM — E05.90 HYPERTHYROIDISM: Status: ACTIVE | Noted: 2024-01-24

## 2024-01-24 PROCEDURE — 99214 OFFICE O/P EST MOD 30 MIN: CPT | Performed by: FAMILY MEDICINE

## 2024-01-24 RX ORDER — LEVOTHYROXINE SODIUM 88 UG/1
88 TABLET ORAL DAILY
Qty: 90 TABLET | Refills: 1 | Status: SHIPPED | OUTPATIENT
Start: 2024-01-24 | End: 2024-01-24 | Stop reason: SDUPTHER

## 2024-01-24 RX ORDER — ALPRAZOLAM 0.25 MG/1
0.25 TABLET ORAL 2 TIMES DAILY PRN
Qty: 30 TABLET | Refills: 0 | Status: SHIPPED | OUTPATIENT
Start: 2024-01-24

## 2024-01-24 RX ORDER — LEVOTHYROXINE SODIUM 88 UG/1
88 TABLET ORAL DAILY
Qty: 90 TABLET | Refills: 1 | Status: SHIPPED | OUTPATIENT
Start: 2024-01-24

## 2024-01-24 RX ORDER — ALPRAZOLAM 0.25 MG/1
0.25 TABLET ORAL 2 TIMES DAILY PRN
Qty: 30 TABLET | Refills: 0 | Status: SHIPPED | OUTPATIENT
Start: 2024-01-24 | End: 2024-01-24 | Stop reason: SDUPTHER

## 2024-01-24 NOTE — PROGRESS NOTES
Name: Apple King      : 1965      MRN: [id number]  Encounter Provider: Fidel Quinones DO  Encounter Date: 2024   Encounter department: [facility]    Assessment & Plan     1. Hypertension, unspecified type  Assessment & Plan:  Blood pressure is stable.  Continue low-sodium diet and regular exercise.      2. Acquired hypothyroidism  Assessment & Plan:  TSH is mildly depressed.  Will decrease levothyroxine from 100 to 88 mcg daily.    Orders:  -     TSH, 3rd generation; Future; Expected date: 2024    3. Mixed hyperlipidemia  Assessment & Plan:  Lipids are elevated with total cholesterol greater than 220.  Patient declines medication treatment at this time.  Will work on diet and exercise.  She does have an high HDL and a favorable total cholesterol/HDL ratio.    Orders:  -     Comprehensive metabolic panel; Future; Expected date: 2024  -     Lipid Panel with Direct LDL reflex; Future; Expected date: 2024    4. Anxiety         Subjective      Patient was seen for follow-up of chronic medical problems.  She is being treated for hypertension and hypothyroidism.  She has no new concerns.  She feels well.  She is compliant with her regular medications.      Review of Systems   Constitutional: Negative.    Respiratory: Negative.     Cardiovascular: Negative.    Gastrointestinal: Negative.    Genitourinary: Negative.    Musculoskeletal: Negative.    Psychiatric/Behavioral: Negative.         Current Outpatient Medications on File Prior to Visit   Medication Sig   • Calcium-Vitamins C & D (CALCIUM/C/D PO) Take by mouth   • Cholecalciferol 50 MCG (2000 UT) CAPS Take by mouth   • estradiol (ESTRACE VAGINAL) 0.1 mg/g vaginal cream Twice weekly   • fluticasone (FLONASE) 50 mcg/act nasal spray 1 spray into each nostril   • L-Lysine 500 MG TABS Take by mouth daily   • lisinopril (ZESTRIL) 5 mg tablet 2.5 mg   • Multiple Vitamins-Minerals (MULTIVITAL PO) Take by mouth  "  • valACYclovir (VALTREX) 1,000 mg tablet TAKE 2 TABLETS BY MOUTH AND REPEAT DOSE IN 12 HOURS       Objective     /88 (BP Location: Left arm, Patient Position: Sitting, Cuff Size: Adult)   Pulse 88   Temp 97.5 °F (36.4 °C)   Ht 5' 2.5\" (1.588 m)   Wt 80 kg (176 lb 6.4 oz)   SpO2 99%   BMI 31.75 kg/m²     Physical Exam  Vitals and nursing note reviewed.   Constitutional:       General: She is not in acute distress.     Appearance: Normal appearance. She is well-developed. She is not diaphoretic.   HENT:      Head: Normocephalic and atraumatic.   Eyes:      General:         Right eye: No discharge.      Conjunctiva/sclera: Conjunctivae normal.      Pupils: Pupils are equal, round, and reactive to light.   Neck:      Thyroid: No thyromegaly.   Cardiovascular:      Rate and Rhythm: Normal rate and regular rhythm.   Pulmonary:      Effort: Pulmonary effort is normal. No respiratory distress.      Breath sounds: Normal breath sounds.   Musculoskeletal:      Cervical back: Normal range of motion.   Lymphadenopathy:      Cervical: No cervical adenopathy.   Skin:     General: Skin is warm and dry.   Neurological:      Mental Status: She is alert and oriented to person, place, and time.   Psychiatric:         Mood and Affect: Mood normal.         Behavior: Behavior normal.         Thought Content: Thought content normal.         Judgment: Judgment normal.       Fidel Quinones, DO    "

## 2024-01-24 NOTE — TELEPHONE ENCOUNTER
NOT A DUPLICATE.      Patient's insurance requires different pharmacy. Resending scripts from today.    Reason for call:   [x] Refill   [] Prior Auth  [] Other:     Office:   [x] PCP/Provider - [facility] / [facility]  [] Specialty/Provider -     Medication:   Alprazolam 0.25mg bid prn  #30  Synthroid brand 88mcg qd  #90    Pharmacy: [facility]     Does the patient have enough for 3 days?   [] Yes   [x] No - Send as HP to POD (new dosage of Synthroid)

## 2024-01-28 PROBLEM — E78.2 MIXED HYPERLIPIDEMIA: Status: ACTIVE | Noted: 2024-01-28

## 2024-01-28 NOTE — ASSESSMENT & PLAN NOTE
Lipids are elevated with total cholesterol greater than 220.  Patient declines medication treatment at this time.  Will work on diet and exercise.  She does have an high HDL and a favorable total cholesterol/HDL ratio.

## 2024-02-20 ENCOUNTER — OFFICE VISIT (OUTPATIENT)
Dept: PODIATRY | Facility: CLINIC | Age: 59
End: 2024-02-20

## 2024-02-20 VITALS — HEIGHT: 62 IN | RESPIRATION RATE: 18 BRPM | BODY MASS INDEX: 32.26 KG/M2

## 2024-02-20 DIAGNOSIS — M72.2 PLANTAR FASCIITIS: Primary | ICD-10-CM

## 2024-02-20 DIAGNOSIS — D36.10 NEUROMA: ICD-10-CM

## 2024-03-08 DIAGNOSIS — G47.30 SLEEP APNEA, UNSPECIFIED TYPE: Primary | ICD-10-CM

## 2024-03-13 ENCOUNTER — TELEPHONE (OUTPATIENT)
Dept: DERMATOLOGY | Facility: CLINIC | Age: 59
End: 2024-03-13

## 2024-03-13 ENCOUNTER — TELEPHONE (OUTPATIENT)
Age: 59
End: 2024-03-13

## 2024-03-13 NOTE — TELEPHONE ENCOUNTER
The patient called because she received a msg that her appt with Dr. Márquez was moved a month out to a location 50 min away with another doctor. She is very upset and can't do that appt as she will be on vacation and I dont have anything else to offer her at this time. The patient questioned why she is just being called if they knew the doctor was going out on Maternity leave. I apologized and advised the patient that I could put her on the cancellation list. Patient declined saying she would speak with her  but hung up very upset.

## 2024-03-13 NOTE — TELEPHONE ENCOUNTER
Called pt to advise her appt with Dr. Márquez on 4/17 needs to be r/s, as provider will be out on maternity leave. Appt was r/s with Dr. Clark in [facility] office on 5/28 @ 2:30. Pt is scheduled back to back with her . LM to call the office to confirm or to r/s if needed.

## 2024-03-14 DIAGNOSIS — G47.30 SLEEP APNEA, UNSPECIFIED TYPE: Primary | ICD-10-CM

## 2024-03-20 ENCOUNTER — OFFICE VISIT (OUTPATIENT)
Dept: OBGYN CLINIC | Facility: MEDICAL CENTER | Age: 59
End: 2024-03-20
Payer: COMMERCIAL

## 2024-03-20 VITALS — WEIGHT: 174 LBS | HEIGHT: 63 IN | BODY MASS INDEX: 30.83 KG/M2

## 2024-03-20 DIAGNOSIS — M25.561 CHRONIC PAIN OF RIGHT KNEE: Primary | ICD-10-CM

## 2024-03-20 DIAGNOSIS — M17.11 PRIMARY OSTEOARTHRITIS OF RIGHT KNEE: ICD-10-CM

## 2024-03-20 DIAGNOSIS — G89.29 CHRONIC PAIN OF RIGHT KNEE: Primary | ICD-10-CM

## 2024-03-20 DIAGNOSIS — S83.231A COMPLEX TEAR OF MEDIAL MENISCUS OF RIGHT KNEE AS CURRENT INJURY, INITIAL ENCOUNTER: ICD-10-CM

## 2024-03-20 PROCEDURE — 20610 DRAIN/INJ JOINT/BURSA W/O US: CPT | Performed by: EMERGENCY MEDICINE

## 2024-03-20 PROCEDURE — 99213 OFFICE O/P EST LOW 20 MIN: CPT | Performed by: EMERGENCY MEDICINE

## 2024-03-20 RX ORDER — LIDOCAINE HYDROCHLORIDE 10 MG/ML
4 INJECTION, SOLUTION INFILTRATION; PERINEURAL
Status: COMPLETED | OUTPATIENT
Start: 2024-03-20 | End: 2024-03-20

## 2024-03-20 RX ORDER — TRIAMCINOLONE ACETONIDE 40 MG/ML
40 INJECTION, SUSPENSION INTRA-ARTICULAR; INTRAMUSCULAR
Status: COMPLETED | OUTPATIENT
Start: 2024-03-20 | End: 2024-03-20

## 2024-03-20 RX ADMIN — LIDOCAINE HYDROCHLORIDE 4 ML: 10 INJECTION, SOLUTION INFILTRATION; PERINEURAL at 15:30

## 2024-03-20 RX ADMIN — TRIAMCINOLONE ACETONIDE 40 MG: 40 INJECTION, SUSPENSION INTRA-ARTICULAR; INTRAMUSCULAR at 15:30

## 2024-03-20 NOTE — PROGRESS NOTES
Assessment/Plan:    Diagnoses and all orders for this visit:    Chronic pain of right knee  -     Large joint arthrocentesis: R knee    Complex tear of medial meniscus of right knee as current injury, initial encounter  -     Large joint arthrocentesis: R knee    Primary osteoarthritis of right knee  -     Large joint arthrocentesis: R knee    Provided repeat steroid injection for the right knee patient will be traveling and flying to [place].  If her symptoms return especially if she experiences mechanical symptoms as discussed in the office recommend referral to sports orthopedic surgeon regarding her medial meniscus tear.    Return if symptoms worsen or fail to improve.      Subjective:   Patient ID: Apple King is a 59 y.o. female.    Patient returns for Right knee pain symptoms she had significant benefit from CSI and had been very active with exercise.  She was experience significant pain as well as her knee giving out on her though symptoms have improved overall however she is still experiencing the mechanical symptoms.  She feels like something is catching denies any locking of the knee.  Notes posterior stiffness especially after sitting    Previous note:  Patient returns s/p Right knee CSI last eval with improvement of symptoms, she has only experienced 1 episode of painful popping, pain present with walking.    Still swimming and playing pickleball 2/week, better with stairs.    Pre CSI max pain /10, Post-CSI pain 2-3/10 with walking.    Taking IBU 200mg which helps    Initial note:  New patient presents referred by PCP Dr. Quinones for chronic right knee pain.  She notes diffuse pain anterior and posterior symptoms are generally worse after prolonged sitting she feels a stiffness and pain.  She does have some pain of the distal hamstring area.  She is also experiencing lower back pain.  She has participated in formal physical therapy for her back and her knee.  She notes pain with standing and  "walking but is able to participate in pickleball.  She has been taking Advil as needed for her pain symptoms.  X-ray and MRI of the right knee were previously obtained and reviewed today.  The patient denies any significant mechanical symptoms of the knee      Review of Systems    The following portions of the patient's chart were reviewed and updated as appropriate:   Allergy:    Allergies   Allergen Reactions    Minocycline Other (See Comments)    Prednisone Other (See Comments)     Joint Aches       Tetracyclines & Related Other (See Comments)     Joint aches        Medications:    Current Outpatient Medications:     ALPRAZolam (XANAX) 0.25 mg tablet, Take 1 tablet (0.25 mg total) by mouth 2 (two) times a day as needed for anxiety, Disp: 30 tablet, Rfl: 0    Calcium-Vitamins C & D (CALCIUM/C/D PO), Take by mouth, Disp: , Rfl:     Cholecalciferol 50 MCG (2000 UT) CAPS, Take by mouth, Disp: , Rfl:     estradiol (ESTRACE VAGINAL) 0.1 mg/g vaginal cream, Twice weekly, Disp: , Rfl:     fluticasone (FLONASE) 50 mcg/act nasal spray, 1 spray into each nostril, Disp: , Rfl:     L-Lysine 500 MG TABS, Take by mouth daily, Disp: , Rfl:     levothyroxine (Synthroid) 88 mcg tablet, Take 1 tablet (88 mcg total) by mouth daily Brand medically necessary, Disp: 90 tablet, Rfl: 1    lisinopril (ZESTRIL) 5 mg tablet, 2.5 mg, Disp: , Rfl:     Multiple Vitamins-Minerals (MULTIVITAL PO), Take by mouth, Disp: , Rfl:     valACYclovir (VALTREX) 1,000 mg tablet, TAKE 2 TABLETS BY MOUTH AND REPEAT DOSE IN 12 HOURS, Disp: , Rfl:     Patient Active Problem List   Diagnosis    Hypertension    Vaginal candidiasis    Acquired hypothyroidism    Mixed hyperlipidemia       Objective:  Ht 5' 2.5\" (1.588 m)   Wt 78.9 kg (174 lb)   BMI 31.32 kg/m²     Right Knee Exam     Other   Erythema: absent            Physical Exam      Neurologic Exam    Large joint arthrocentesis: R knee  Universal Protocol:  Consent: Verbal consent obtained.  Risks and " "benefits: risks, benefits and alternatives were discussed  Consent given by: patient  Time out: Immediately prior to procedure a \"time out\" was called to verify the correct patient, procedure, equipment, support staff and site/side marked as required.  Timeout called at: 3/20/2024 4:20 PM.  Patient understanding: patient states understanding of the procedure being performed  Test results: test results available and properly labeled  Site marked: the operative site was marked  Patient identity confirmed: verbally with patient  Supporting Documentation  Indications: pain   Procedure Details  Location: knee - R knee  Preparation: Patient was prepped and draped in the usual sterile fashion  Needle size: 22 G  Ultrasound guidance: no  Approach: anterolateral  Medications administered: 4 mL lidocaine 1 %; 40 mg triamcinolone acetonide 40 mg/mL    Patient tolerance: patient tolerated the procedure well with no immediate complications  Dressing:  Sterile dressing applied    No erythema of knee(s)          I have personally reviewed the written report of the pertinent studies.             Past Medical History:   Diagnosis Date    Hypothyroidism     Rosacea        Past Surgical History:   Procedure Laterality Date    BLADDER SURGERY  2018    TRIGGER FINGER RELEASE Right 02/27/2023       Social History     Socioeconomic History    Marital status: /Civil Union     Spouse name: Not on file    Number of children: Not on file    Years of education: Not on file    Highest education level: Not on file   Occupational History    Not on file   Tobacco Use    Smoking status: Never     Passive exposure: Never    Smokeless tobacco: Never   Vaping Use    Vaping status: Never Used   Substance and Sexual Activity    Alcohol use: Not Currently    Drug use: Never    Sexual activity: Not on file   Other Topics Concern    Not on file   Social History Narrative    Not on file     Social Determinants of Health     Financial Resource Strain: " Not on file   Food Insecurity: Not on file   Transportation Needs: Not on file   Physical Activity: Not on file   Stress: Not on file   Social Connections: Not on file   Intimate Partner Violence: Not on file   Housing Stability: Not on file       Family History   Problem Relation Age of Onset    Hypothyroidism Mother     Anemia Mother     Lung cancer Mother     Hypertension Mother     Diabetes Brother     Emphysema Brother     Pancreatitis Brother

## 2024-04-11 DIAGNOSIS — E03.9 ACQUIRED HYPOTHYROIDISM: ICD-10-CM

## 2024-04-12 RX ORDER — LEVOTHYROXINE SODIUM 88 UG/1
88 TABLET ORAL DAILY
Qty: 90 TABLET | Refills: 1 | Status: SHIPPED | OUTPATIENT
Start: 2024-04-12

## 2024-04-24 DIAGNOSIS — B00.1 RECURRENT COLD SORES: Primary | ICD-10-CM

## 2024-04-24 RX ORDER — VALACYCLOVIR HYDROCHLORIDE 1 G/1
2000 TABLET, FILM COATED ORAL 2 TIMES DAILY
Qty: 12 TABLET | Refills: 1 | Status: SHIPPED | OUTPATIENT
Start: 2024-04-24 | End: 2024-04-25

## 2024-04-24 NOTE — TELEPHONE ENCOUNTER
Pt would like a refill on her valacyelovir 1 gram tablet to [facility] pharmacy. Thank you.

## 2024-05-01 ENCOUNTER — OFFICE VISIT (OUTPATIENT)
Dept: OBGYN CLINIC | Facility: MEDICAL CENTER | Age: 59
End: 2024-05-01
Payer: COMMERCIAL

## 2024-05-01 ENCOUNTER — APPOINTMENT (OUTPATIENT)
Dept: RADIOLOGY | Facility: MEDICAL CENTER | Age: 59
End: 2024-05-01
Payer: COMMERCIAL

## 2024-05-01 VITALS
SYSTOLIC BLOOD PRESSURE: 119 MMHG | DIASTOLIC BLOOD PRESSURE: 79 MMHG | WEIGHT: 176 LBS | BODY MASS INDEX: 31.18 KG/M2 | HEART RATE: 76 BPM | HEIGHT: 63 IN

## 2024-05-01 DIAGNOSIS — M25.562 ACUTE PAIN OF LEFT KNEE: ICD-10-CM

## 2024-05-01 DIAGNOSIS — M25.562 ACUTE PAIN OF LEFT KNEE: Primary | ICD-10-CM

## 2024-05-01 DIAGNOSIS — M25.462 EFFUSION OF LEFT KNEE: ICD-10-CM

## 2024-05-01 DIAGNOSIS — E66.9 CLASS 1 OBESITY WITH BODY MASS INDEX (BMI) OF 31.0 TO 31.9 IN ADULT, UNSPECIFIED OBESITY TYPE, UNSPECIFIED WHETHER SERIOUS COMORBIDITY PRESENT: ICD-10-CM

## 2024-05-01 DIAGNOSIS — M17.12 PRIMARY OSTEOARTHRITIS OF LEFT KNEE: ICD-10-CM

## 2024-05-01 PROCEDURE — 20610 DRAIN/INJ JOINT/BURSA W/O US: CPT | Performed by: EMERGENCY MEDICINE

## 2024-05-01 PROCEDURE — 99214 OFFICE O/P EST MOD 30 MIN: CPT | Performed by: EMERGENCY MEDICINE

## 2024-05-01 PROCEDURE — 73564 X-RAY EXAM KNEE 4 OR MORE: CPT

## 2024-05-01 RX ORDER — LIDOCAINE HYDROCHLORIDE 10 MG/ML
4 INJECTION, SOLUTION INFILTRATION; PERINEURAL
Status: COMPLETED | OUTPATIENT
Start: 2024-05-01 | End: 2024-05-01

## 2024-05-01 RX ORDER — TRIAMCINOLONE ACETONIDE 40 MG/ML
40 INJECTION, SUSPENSION INTRA-ARTICULAR; INTRAMUSCULAR
Status: COMPLETED | OUTPATIENT
Start: 2024-05-01 | End: 2024-05-01

## 2024-05-01 RX ADMIN — LIDOCAINE HYDROCHLORIDE 4 ML: 10 INJECTION, SOLUTION INFILTRATION; PERINEURAL at 09:30

## 2024-05-01 RX ADMIN — TRIAMCINOLONE ACETONIDE 40 MG: 40 INJECTION, SUSPENSION INTRA-ARTICULAR; INTRAMUSCULAR at 09:30

## 2024-05-01 NOTE — PATIENT INSTRUCTIONS
Chondroitin Sulfate/Glucosamine Hydrochloride (By mouth)   Chondroitin Sulfate (glenny-DROY-tin SUL-fate), Glucosamine Hydrochloride (speh-RLSG-i-meen casey-droe-KLOR-joselito)  Helps maintain and support joint cartilage. This medicine may improve mobility and flexibility of the joints.   Brand Name(s): Cidaflex, Cosamin DS, [facility] Glucosamine Chondroitin, Optimum Glucosamine & Chondroitin, Osteo Bi-Flex   There may be other brand names for this medicine.  When This Medicine Should Not Be Used:   You should not use this medicine if you have had an allergic reaction to chondroitin or glucosamine.   How to Use This Medicine:   Tablet  Your doctor will tell you how much medicine to use. Do not use more than directed.  Follow the instructions on the medicine label if you are using this medicine without a prescription.  It is best to take this medicine with food.  If a dose is missed:   Take a dose as soon as you remember. If it is almost time for your next dose, wait until then and take a regular dose. Do not take extra medicine to make up for a missed dose.  How to Store and Dispose of This Medicine:   Store the medicine in a closed container at room temperature, away from heat, moisture, and direct light.  Ask your pharmacist, doctor, or health caregiver about the best way to dispose of any outdated medicine or medicine no longer needed.  Keep all medicine out of the reach of children. Never share your medicine with anyone.  Drugs and Foods to Avoid:      Ask your doctor or pharmacist before using any other medicine, including over-the-counter medicines, vitamins, and herbal products.      Warnings While Using This Medicine:   Make sure your doctor knows if you are pregnant or breastfeeding.  This medicine contains products made from shellfish. If you are allergic to shellfish (shrimp, crab, lobster, crayfish), check with your doctor first before deciding to take this medicine.  Possible Side Effects While Using  This Medicine:   Call your doctor right away if you notice any of these side effects:  Allergic reaction: Itching or hives, swelling in your face or hands, swelling or tingling in your mouth or throat, chest tightness, trouble breathing  If you notice other side effects that you think are caused by this medicine, tell your doctor.   Call your doctor for medical advice about side effects. You may report side effects to FDA at 6-425-DDZ-0305  © Copyright [employer] 2023 Information is for End User's use only and may not be sold, redistributed or otherwise used for commercial purposes.  The above information is an  only. It is not intended as medical advice for individual conditions or treatments. Talk to your doctor, nurse or pharmacist before following any medical regimen to see if it is safe and effective for you.      In order to minimize further degenerative changes and pain from arthritis we recommend maintaining an active lifestyle and continually trying to maintain a healthy weight / BMI (Body Mass Index).  If you are interested we can refer you to Weight Management to help with weight loss.  I recommended avoiding any tobacco use.  On rainy days and cold days you may have worsening pain than baseline.    [email or web address] for more information     Steroid Joint Injection   AMBULATORY CARE:   What you need to know about steroid joint injection:  A steroid joint injection is a procedure to inject steroid medicine into a joint. Steroid medicine decreases pain and inflammation. The injection may also contain an anesthetic (numbing medicine) to decrease pain. It may be done to treat conditions such as arthritis, gout, or carpal tunnel syndrome. The injections may be given in your knee, ankle, shoulder, elbow, wrist, or ankle.   How to prepare for steroid joint injection:  Your healthcare provider will talk to you about how to prepare for this procedure. He will tell you what medicines to take or not  take on the day of your procedure. You may need to stop taking blood thinners several days before your procedure.   What will happen during steroid joint injection:  You may be given local anesthesia to numb the area where the injection will be given. With local anesthesia, you may still feel pressure during the procedure, but you should not feel any pain. Your healthcare provider may use ultrasound or fluoroscopy (a type of x-ray) to guide the needle to the right area. He will then inject the steroid into your joint. A bandage will be placed on the injection site.   What will happen after steroid joint injection:  You may have redness, warmth, or sweating in your face and chest right after the steroid injection. Steroids can affect blood sugar levels. If you have diabetes, you should check your blood sugars closely in the first 24 hours after your procedure.   Risks of steroid joint injection:  You may get an infection in your joint. The injection may also cause more pain during the first 24 to 36 hours. You may need more than one injection to feel pain relief. The skin near the injection site may be damaged and become discolored or indented. This can happen if the steroid is placed too close to your skin. A tendon near the injection site may rupture or a nerve can be damaged.  Contact your healthcare provider if:   You have fever or chills.     You have redness or swelling in the injection site.     You have more pain than usual in your joint for more than 72 hours.     You have questions or concerns about your condition or care.  Medicines:   Pain medicine  may be given. Ask how to take this medicine safely.     Take your medicine as directed.  Contact your healthcare provider if you think your medicine is not helping or if you have side effects. Tell him or her if you are allergic to any medicine. Keep a list of the medicines, vitamins, and herbs you take. Include the amounts, and when and why you take them.  Bring the list or the pill bottles to follow-up visits. Carry your medicine list with you in case of an emergency.  Self-care:   Leave the bandage on for 8 to 12 hours.  Care for your wound as directed.    Rest the area  as directed. You may need to decrease weight on certain joints, such as the knee, for a period of time. Ask when you can return to your daily activities.     Elevate  your limb where the steroid injection was given. Elevate the limb above the level of your heart as often as you can. This will help decrease swelling and pain. Prop your limb on pillows or blankets to keep it elevated comfortably.     Apply ice  on your joint for 15 to 20 minutes every hour or as directed. Use an ice pack, or put crushed ice in a plastic bag. Cover it with a towel. Ice helps prevent tissue damage and decreases swelling and pain.  Follow up with your healthcare provider as directed:  Write down your questions so you remember to ask them during your visits.   © 2017 [employer] Information is for End User's use only and may not be sold, redistributed or otherwise used for commercial purposes. All illustrations and images included in CareNotes® are the copyrighted property of [employer]. or [employer].  The above information is an  only. It is not intended as medical advice for individual conditions or treatments. Talk to your doctor, nurse or pharmacist before following any medical regimen to see if it is safe and effective for you.      Arthritis   AMBULATORY CARE:   Arthritis  is a disease that causes inflammation in one or more joints. There are many types of arthritis, such as osteoarthritis, rheumatoid arthritis, and septic arthritis. Some types cause inflammation in the joints. Other types wear away the cartilage between joints. This makes the bones of the joint rub together when you move the joint. Your symptoms may be constant, or symptoms may come and go. Arthritis  often gets worse over time and can cause permanent joint damage.  Common signs and symptoms of arthritis:   Pain, swelling, or stiffness in the joint    Limited range of motion in the joint    Warmth or redness over the joint    Tenderness when you touch the joint    Stiff joints in the morning that loosen with movement    A creaking or grinding sound when you move the joint    Fever  Seek care immediately if:   You have a fever and severe joint pain or swelling.     You cannot move the affected joint.     You have severe joint pain you cannot tolerate.  Contact your healthcare provider if:   Your pain or swelling does not get better with treatment.    You have questions or concerns about your condition or care.  Treatment  will depend on the type of arthritis you have and if it is severe. You may need any of the following:  Acetaminophen  decreases pain and fever. It is available without a doctor's order. Ask how much to take and how often to take it. Follow directions. Acetaminophen can cause liver damage if not taken correctly.    NSAIDs , such as ibuprofen, help decrease swelling, pain, and fever. This medicine is available with or without a doctor's order. NSAIDs can cause stomach bleeding or kidney problems in certain people. If you take blood thinner medicine, always ask your healthcare provider if NSAIDs are safe for you. Always read the medicine label and follow directions.    Steroid medicine  helps reduce swelling and pain.     Surgery  may be needed to repair or replace a damaged joint.  Manage arthritis:   Rest your painful joint so it can heal.  Your healthcare provider may recommend crutches or a walker if the affected joint is in a leg.     Apply ice or heat to the joint.  Both can help decrease swelling and pain. Ice may also help prevent tissue damage. Use an ice pack, or put crushed ice in a plastic bag. Cover it with a towel and place it on your joint for 15 to 20 minutes every hour or as  directed. You can apply heat for 20 minutes every 2 hours. Heat treatment includes hot packs or heat lamps.    Elevate your joint.  Elevation helps reduce swelling and pain. Raise your joint above the level of your heart as often as you can. Prop your painful joint on pillows to keep it above your heart comfortably.    Go to physical or occupational therapy as directed.  A physical therapist can teach you exercises to improve flexibility and range of motion. You may also be shown non-weight-bearing exercises that are safe for your joints, such as swimming. Exercise can help keep your joints flexible and reduce pain. An occupational therapist can help you learn to do your daily activities when your joints are stiff or sore.    Maintain a healthy weight.  Extra weight puts increased pressure on your joints. Ask your healthcare provider what you should weigh. If you need to lose weight, he can help you create a weight loss program. Weight loss can help reduce pain and increase your ability to do your activities. The amount of exercise you do may vary each day, depending on your symptoms.    Wear flat or low-heeled shoes.  This will help decrease pain and reduce pressure on your ankle, knee, and hip joints.    Use support devices as directed.  You may be given splints to wear on your hands to help your joints rest and to decrease inflammation. While you sleep, use a pillow that is firm enough to support your neck and head.  Other equipment  that may help you move and prevent falls:  Orthotic shoes or insoles  help support your feet when you walk.    Crutches, a cane, or a walker  may help decrease your risk for falling. They also decrease stress on affected joints.     Devices to prevent falls  include raised toilet seats and bathtub bars to help you get up from sitting. Handrails can be placed in areas where you need balance and support.  Follow up with your healthcare provider or rheumatologist as directed:  Write  down your questions so you remember to ask them during your visits.  © 2017 [employer] Information is for End User's use only and may not be sold, redistributed or otherwise used for commercial purposes. All illustrations and images included in CareNotes® are the copyrighted property of [employer] or [employer].  The above information is an  only. It is not intended as medical advice for individual conditions or treatments. Talk to your doctor, nurse or pharmacist before following any medical regimen to see if it is safe and effective for you.

## 2024-05-01 NOTE — PROGRESS NOTES
Assessment/Plan:    Diagnoses and all orders for this visit:    Acute pain of left knee  -     XR knee 4+ vw left injury; Future  -     Large joint arthrocentesis: L knee  -     Ambulatory Referral to Nutrition Services; Future  -     Ambulatory Referral to Physical Therapy; Future    Primary osteoarthritis of left knee  -     Large joint arthrocentesis: L knee  -     Ambulatory Referral to Nutrition Services; Future  -     Ambulatory Referral to Physical Therapy; Future    Effusion of left knee  -     Large joint arthrocentesis: L knee  -     Ambulatory Referral to Nutrition Services; Future  -     Ambulatory Referral to Physical Therapy; Future    Class 1 obesity with body mass index (BMI) of 31.0 to 31.9 in adult, unspecified obesity type, unspecified whether serious comorbidity present  -     Large joint arthrocentesis: L knee  -     Ambulatory Referral to Nutrition Services; Future  -     Ambulatory Referral to Physical Therapy; Future    Xrays Left knee obtained today  Discussed treatment options for knee DJD, including weight loss, medications, supplements, PT, exercise and activity modification.  Patient is motivated and I believe will do well with this plan.  Left knee CSI provided today    BMI Counseling: Body mass index is 31.68 kg/m². The BMI is above normal. Nutrition recommendations include consuming healthier snacks. Exercise recommendations include exercising 3-5 times per week. Pharmacotherapy was ordered for patient to aid in weight loss. Patient referred to nutritionist due to patient being obese.     Return if symptoms worsen or fail to improve.      Subjective:   Patient ID: Apple King is a 59 y.o. female.    Apple presents for about a month of Left knee pain.  She notes posterior pain and stiffness and anterior lateral clicking.  She recently went on a cruise and did a lot of stairs.    She recently received Right CSI with benefit        Review of Systems    The following portions of  "the patient's chart were reviewed and updated as appropriate:   Allergy:    Allergies   Allergen Reactions    Minocycline Other (See Comments)    Prednisone Other (See Comments)     Joint Aches       Tetracyclines & Related Other (See Comments)     Joint aches        Medications:    Current Outpatient Medications:     ALPRAZolam (XANAX) 0.25 mg tablet, Take 1 tablet (0.25 mg total) by mouth 2 (two) times a day as needed for anxiety, Disp: 30 tablet, Rfl: 0    Calcium-Vitamins C & D (CALCIUM/C/D PO), Take by mouth, Disp: , Rfl:     Cholecalciferol 50 MCG (2000 UT) CAPS, Take by mouth, Disp: , Rfl:     estradiol (ESTRACE VAGINAL) 0.1 mg/g vaginal cream, Twice weekly, Disp: , Rfl:     fluticasone (FLONASE) 50 mcg/act nasal spray, 1 spray into each nostril, Disp: , Rfl:     L-Lysine 500 MG TABS, Take by mouth daily, Disp: , Rfl:     levothyroxine (Synthroid) 88 mcg tablet, Take 1 tablet (88 mcg total) by mouth daily Brand medically necessary, Disp: 90 tablet, Rfl: 1    lisinopril (ZESTRIL) 5 mg tablet, 2.5 mg, Disp: , Rfl:     Multiple Vitamins-Minerals (MULTIVITAL PO), Take by mouth, Disp: , Rfl:     valACYclovir (VALTREX) 1,000 mg tablet, Take 2 tablets (2,000 mg total) by mouth 2 (two) times a day for 1 day, Disp: 12 tablet, Rfl: 1    Patient Active Problem List   Diagnosis    Hypertension    Vaginal candidiasis    Acquired hypothyroidism    Mixed hyperlipidemia       Objective:  /79   Pulse 76   Ht 5' 2.5\" (1.588 m)   Wt 79.8 kg (176 lb)   BMI 31.68 kg/m²     Left Knee Exam     Tenderness   The patient is experiencing tenderness in the lateral joint line.    Range of Motion   The patient has normal left knee ROM.    Tests   Jenny:  Medial - negative Lateral - negative    Other   Erythema: absent  Sensation: normal  Swelling: mild  Effusion: effusion present          Observations   Left Knee   Positive for effusion.       Physical Exam  Musculoskeletal:      Left knee: Effusion present.      Instability " "Tests: Medial Jenny test negative and lateral Jenny test negative.           Neurologic Exam    Large joint arthrocentesis: L knee  Universal Protocol:  Consent: Verbal consent obtained.  Risks and benefits: risks, benefits and alternatives were discussed  Consent given by: patient  Time out: Immediately prior to procedure a \"time out\" was called to verify the correct patient, procedure, equipment, support staff and site/side marked as required.  Timeout called at: 5/1/2024 10:07 AM.  Patient understanding: patient states understanding of the procedure being performed  Test results: test results available and properly labeled  Site marked: the operative site was marked  Patient identity confirmed: verbally with patient  Supporting Documentation  Indications: pain   Procedure Details  Location: knee - L knee  Preparation: Patient was prepped and draped in the usual sterile fashion  Needle size: 22 G  Ultrasound guidance: no  Approach: anterolateral  Medications administered: 4 mL lidocaine 1 %; 40 mg triamcinolone acetonide 40 mg/mL    Patient tolerance: patient tolerated the procedure well with no immediate complications  Dressing:  Sterile dressing applied    No erythema of knee(s)        I have personally reviewed pertinent films in PACS and my interpretation is Xray Left Knee narrowing of the medial compartment more pronounced on the PA flexion, as well as narrowing of the patellofemoral joint            Past Medical History:   Diagnosis Date    Hypothyroidism     Rosacea        Past Surgical History:   Procedure Laterality Date    BLADDER SURGERY  2018    TRIGGER FINGER RELEASE Right 02/27/2023       Social History     Socioeconomic History    Marital status: /Civil Union     Spouse name: Not on file    Number of children: Not on file    Years of education: Not on file    Highest education level: Not on file   Occupational History    Not on file   Tobacco Use    Smoking status: Never     Passive " exposure: Never    Smokeless tobacco: Never   Vaping Use    Vaping status: Never Used   Substance and Sexual Activity    Alcohol use: Not Currently    Drug use: Never    Sexual activity: Not on file   Other Topics Concern    Not on file   Social History Narrative    Not on file     Social Determinants of Health     Financial Resource Strain: Not on file   Food Insecurity: Not on file   Transportation Needs: Not on file   Physical Activity: Not on file   Stress: Not on file   Social Connections: Not on file   Intimate Partner Violence: Not on file   Housing Stability: Not on file       Family History   Problem Relation Age of Onset    Hypothyroidism Mother     Anemia Mother     Lung cancer Mother     Hypertension Mother     Diabetes Brother     Emphysema Brother     Pancreatitis Brother

## 2024-05-07 ENCOUNTER — EVALUATION (OUTPATIENT)
Dept: PHYSICAL THERAPY | Facility: CLINIC | Age: 59
End: 2024-05-07
Payer: COMMERCIAL

## 2024-05-07 DIAGNOSIS — E66.9 CLASS 1 OBESITY WITH BODY MASS INDEX (BMI) OF 31.0 TO 31.9 IN ADULT, UNSPECIFIED OBESITY TYPE, UNSPECIFIED WHETHER SERIOUS COMORBIDITY PRESENT: ICD-10-CM

## 2024-05-07 DIAGNOSIS — M17.12 PRIMARY OSTEOARTHRITIS OF LEFT KNEE: ICD-10-CM

## 2024-05-07 DIAGNOSIS — M25.462 EFFUSION OF LEFT KNEE: ICD-10-CM

## 2024-05-07 DIAGNOSIS — M25.562 ACUTE PAIN OF LEFT KNEE: Primary | ICD-10-CM

## 2024-05-07 PROCEDURE — 97110 THERAPEUTIC EXERCISES: CPT | Performed by: PHYSICAL THERAPIST

## 2024-05-07 PROCEDURE — 97162 PT EVAL MOD COMPLEX 30 MIN: CPT | Performed by: PHYSICAL THERAPIST

## 2024-05-07 NOTE — PROGRESS NOTES
PT Evaluation     Today's date: 2024  Patient name: Apple King  : 1965  MRN: [id number]  Referring provider: Joey Matamoros MD  Dx:   Encounter Diagnosis     ICD-10-CM    1. Acute pain of left knee  M25.562 Ambulatory Referral to Physical Therapy      2. Primary osteoarthritis of left knee  M17.12 Ambulatory Referral to Physical Therapy      3. Effusion of left knee  M25.462 Ambulatory Referral to Physical Therapy      4. Class 1 obesity with body mass index (BMI) of 31.0 to 31.9 in adult, unspecified obesity type, unspecified whether serious comorbidity present  E66.9 Ambulatory Referral to Physical Therapy    Z68.31                      Assessment  Assessment details: Apple King is a 59 y.o. female presenting to physical therapy with acute left knee pain and presents withpain, decreased range of motion, decreased strength, gait/balance dysfunction, and decreased activity tolerance.  Assessment indicates potential intra-articular with significant proximal LE weakness and decreased muscle length.  Secondary to these impairments, patient has increased difficulty performing ADL's, household chores, and  work related tasks.  Apple would benefit from skilled PT to address these issues and maximize function.  Thank you for the referral.    Impairments: abnormal gait, abnormal muscle tone, abnormal or restricted ROM, abnormal movement, activity intolerance, impaired balance, impaired physical strength, lacks appropriate home exercise program, pain with function and weight-bearing intolerance  Understanding of Dx/Px/POC: excellent  Goals  STG (4 weeks)  1. Patient will be independent with HEP  2. Decrease pain at worst by 2 points on NPRS  3. Patient will demonstrate ability to transition from sit to stand without pain    LTG (8 weeks)  1. Decrease pain at worst from 4 points on NPRS  2. Increase left equal to right quad length  3. Increase left gluteus medius strength by 1/2 MMT grade for reduced  dynamic valgus stress  4. Patient will demonstrate ability to navigate stairs reciprocally without pain  5. Increase FOTO score > or equal to expected outcome    Plan  Patient would benefit from: skilled PT  Planned therapy interventions: joint mobilization, manual therapy, neuromuscular re-education, patient education, strengthening, stretching, therapeutic exercise, home exercise program, ADL training and balance  Frequency: 1x week  Duration in weeks: 8  Treatment plan discussed with: patient        Subjective Evaluation    History of Present Illness  Mechanism of injury: Patient reports to outpatient PT secondary to the onset of acute left knee pain.  Patient states that the pain began while on a cruise 6 weeks prior; noting that she performed lots of steps.  Patient describes the pain as achy and stiff which is worse with prolonged straightening, bending, stair navigation (Descending) and occasional popping which is improved with OTC medications and CBD cream.  Patient had an appointment with Dr. Matamoros on 2024 and received a CSI with 50-75% improvement but clicking and buckling remains.  Patient is retired but notes difficulty with ADL's and leisure functions as a result.  Patients goals for PT are to decrease the pain and return to PLOF and get more active now that she is retired.  Patient also wishes to gain knowledge regarding a gym program for continued benefit regarding B/L knee OA.           Recurrent probem    Quality of life: good    Patient Goals  Patient goals for therapy: increased strength, independence with ADLs/IADLs, return to sport/leisure activities, increased motion and decreased pain    Pain  Current pain ratin  At best pain ratin  At worst pain ratin  Quality: dull ache  Relieving factors: rest and relaxation  Aggravating factors: stair climbing  Progression: worsening          Objective     Active Range of Motion   Left Knee   Flexion: WFL  Extension: WFL    Right Knee  "  Flexion: WFL  Extension: WFL    Mobility   Patellar Mobility:   Left Knee   WFL: medial, lateral, superior and inferior.     Strength/Myotome Testing     Left Knee   Flexion: 4+  Extension: 4+  Quadriceps contraction: good    Right Knee   Flexion: 4+  Extension: 4+  Quadriceps contraction: good    Additional Strength Details  Gluteus medius strengthening:    R = 3-/5, L = 3-/5    Tests     Left Knee   Positive Apley's compression.   Negative lateral Jenny and medial Jenny.     Additional Tests Details  (+) Ely    (-) 90-90 HS             Precautions:   Patient Active Problem List   Diagnosis    Hypertension    Vaginal candidiasis    Acquired hypothyroidism    Mixed hyperlipidemia     B/L knee OA      Manuals 5/7                                                                Neuro Re-Ed                                                                                                        Ther Ex             Recumbent bike warm up             Standing gastroc stretch             Self quad stretch in prone 3x20\"            Seated HS stretch 3x20\"            Side clamshells - orange TB 3x10 B/L            Side lying hip abduction w/ top knee bent 3x10 B/L            Leg Press             Wall squat w/ pball             Monster walks to failure                                       Ther Activity                                       Gait Training                                       Modalities                                            "

## 2024-05-14 ENCOUNTER — OFFICE VISIT (OUTPATIENT)
Dept: PHYSICAL THERAPY | Facility: CLINIC | Age: 59
End: 2024-05-14
Payer: COMMERCIAL

## 2024-05-14 DIAGNOSIS — M25.562 ACUTE PAIN OF LEFT KNEE: Primary | ICD-10-CM

## 2024-05-14 DIAGNOSIS — M17.12 PRIMARY OSTEOARTHRITIS OF LEFT KNEE: ICD-10-CM

## 2024-05-14 DIAGNOSIS — E66.9 CLASS 1 OBESITY WITH BODY MASS INDEX (BMI) OF 31.0 TO 31.9 IN ADULT, UNSPECIFIED OBESITY TYPE, UNSPECIFIED WHETHER SERIOUS COMORBIDITY PRESENT: ICD-10-CM

## 2024-05-14 DIAGNOSIS — M25.462 EFFUSION OF LEFT KNEE: ICD-10-CM

## 2024-05-14 PROCEDURE — 97110 THERAPEUTIC EXERCISES: CPT

## 2024-05-14 NOTE — PROGRESS NOTES
"Daily Note     Today's date: 2024  Patient name: Apple King  : 1965  MRN: [id number]  Referring provider: Joey Matamoros MD  Dx:   Encounter Diagnosis     ICD-10-CM    1. Acute pain of left knee  M25.562       2. Primary osteoarthritis of left knee  M17.12       3. Effusion of left knee  M25.462       4. Class 1 obesity with body mass index (BMI) of 31.0 to 31.9 in adult, unspecified obesity type, unspecified whether serious comorbidity present  E66.9     Z68.31                      Subjective: Pt report she is doing well. The clicking/popping has decreased but the pain is still present. Onset of pain is random and comes without warning. Pt reports she was able to play pickleball without issue yesterday, but had trouble walking in [employer] because of a painful incident on a separate day.       Objective: See treatment diary below      Assessment: Tolerated treatment well. Pt had some discomfort with monster walks, pt reports it aggravated her knee. Pt performed all other exercises without issue, Pt would benefit from continued focus on stretching, strengthening, and stability based exercises to decrease pain and increase overall function. Patient demonstrated fatigue post treatment, exhibited good technique with therapeutic exercises, and would benefit from continued PT      Plan: Continue per plan of care.      Precautions:   Patient Active Problem List   Diagnosis    Hypertension    Vaginal candidiasis    Acquired hypothyroidism    Mixed hyperlipidemia     B/L knee OA      Manuals                                                                Neuro Re-Ed                                                                                                        Ther Ex             Recumbent bike warm up  8'           Standing gastroc stretch  3x20\"           Self quad stretch in prone 3x20\" 2x20\"           Seated HS stretch 3x20\" 3x20\"           Side clamshells - orange TB 3x10 B/L 3x10 B/L      "      Side lying hip abduction w/ top knee bent 3x10 B/L nv           Leg Press             Wall squat w/ pball  2x10           Monster walks to failure  2 laps           Hip 3-way  2x10 OTB           Bridges + abd  OTB 3x10           Ther Activity                                       Gait Training                                       Modalities

## 2024-05-21 ENCOUNTER — OFFICE VISIT (OUTPATIENT)
Dept: PHYSICAL THERAPY | Facility: CLINIC | Age: 59
End: 2024-05-21
Payer: COMMERCIAL

## 2024-05-21 DIAGNOSIS — M17.12 PRIMARY OSTEOARTHRITIS OF LEFT KNEE: ICD-10-CM

## 2024-05-21 DIAGNOSIS — M25.562 ACUTE PAIN OF LEFT KNEE: Primary | ICD-10-CM

## 2024-05-21 DIAGNOSIS — M25.462 EFFUSION OF LEFT KNEE: ICD-10-CM

## 2024-05-21 PROCEDURE — 97110 THERAPEUTIC EXERCISES: CPT | Performed by: PHYSICAL THERAPIST

## 2024-05-21 NOTE — PROGRESS NOTES
"Daily Note     Today's date: 2024  Patient name: Apple King  : 1965  MRN: [id number]  Referring provider: Joey Matamoros MD  Dx:   Encounter Diagnosis     ICD-10-CM    1. Acute pain of left knee  M25.562       2. Primary osteoarthritis of left knee  M17.12       3. Effusion of left knee  M25.462                      Subjective: Patient notes steady improvement but similar to last session regarding occasional popping in the knee.  Was able to play pickle ball again without issue.      Objective: See treatment diary below      Assessment: Discussed HEP with appropriate understanding.  Able to perform monster walks with aching but no significant clicking.  Modified hip abduction exercise to maximize gluteus medius contributions.  Overall patient tolerated treatment well and is making progress toward goals.       Plan: Continue per plan of care.      Precautions:   Patient Active Problem List   Diagnosis    Hypertension    Vaginal candidiasis    Acquired hypothyroidism    Mixed hyperlipidemia     B/L knee OA      Manuals                                                               Neuro Re-Ed                                                                                                        Ther Ex             Recumbent bike warm up  8' L1 5'          Standing gastroc stretch  3x20\" 3x20\" B/L          Self quad stretch in prone 3x20\" 2x20\" 2x20\"          Seated HS stretch 3x20\" 3x20\" 3x20\"          Side clamshells - orange TB 3x10 B/L 3x10 B/L 3x10 B/L          Side lying hip abduction w/ top knee bent 3x10 B/L nv 2x10 B/L          Leg Press             Wall squat w/ pball  2x10 3x10          Monster walks to failure - green TB  2 laps 2 sets to fatigue          Hip 3-way - green TB  2x10 OTB 3x10 B/L          Bridges + abd  OTB 3x10 OTB 3x10          Ther Activity                                       Gait Training                                       Modalities                      " no IV

## 2024-06-10 ENCOUNTER — OFFICE VISIT (OUTPATIENT)
Dept: PHYSICAL THERAPY | Facility: CLINIC | Age: 59
End: 2024-06-10
Payer: COMMERCIAL

## 2024-06-10 ENCOUNTER — HOSPITAL ENCOUNTER (OUTPATIENT)
Dept: SLEEP CENTER | Facility: CLINIC | Age: 59
Discharge: HOME | End: 2024-06-10

## 2024-06-10 DIAGNOSIS — M25.562 ACUTE PAIN OF LEFT KNEE: Primary | ICD-10-CM

## 2024-06-10 DIAGNOSIS — M17.12 PRIMARY OSTEOARTHRITIS OF LEFT KNEE: ICD-10-CM

## 2024-06-10 DIAGNOSIS — G47.30 SLEEP APNEA, UNSPECIFIED TYPE: ICD-10-CM

## 2024-06-10 PROCEDURE — 97110 THERAPEUTIC EXERCISES: CPT | Performed by: PHYSICAL THERAPIST

## 2024-06-10 NOTE — PROGRESS NOTES
"Daily Note     Today's date: 6/10/2024  Patient name: Apple King  : 1965  MRN: [id number]  Referring provider: Joey Matamoros MD  Dx:   Encounter Diagnosis     ICD-10-CM    1. Acute pain of left knee  M25.562       2. Primary osteoarthritis of left knee  M17.12                        Subjective: Patient states that she is experiencing discomfort with walking but was able to resume pickle ball again without pain.        Objective: See treatment diary below      Assessment: Patient demonstrates continued improvement in activity tolerance, able to complete exercises without clicking or popping.  Proper form and mechanics with progressed resistance as noted below.  Overall patient continues to make steady progress toward functional goals.      Plan: Continue per plan of care.      Precautions:   Patient Active Problem List   Diagnosis    Hypertension    Vaginal candidiasis    Acquired hypothyroidism    Mixed hyperlipidemia     B/L knee OA      Manuals 5/7 5/14 5/21 6/10                                                             Neuro Re-Ed                                                                                                        Ther Ex             Recumbent bike warm up  8' L1 5' L1 5'         Standing gastroc stretch  3x20\" 3x20\" B/L 3x20\" B/L         Self quad stretch in prone 3x20\" 2x20\" 2x20\" 2x20\"         Seated HS stretch 3x20\" 3x20\" 3x20\" 3x20\"         Side clamshells - Green TB 3x10 B/L 3x10 B/L 3x10 B/L 3x10 B/L         Side lying hip abduction w/ top knee bent 3x10 B/L nv 2x10 B/L NV         Leg Press             Wall squat w/ pball  2x10 3x10 3x10         Monster walks to failure - green TB  2 laps 2 sets to fatigue 2 sets to fatigue         Hip 3-way - green TB  2x10 OTB 3x10 B/L 3x10 B/L         Bridges + abd  OTB 3x10 OTB 3x10 GTB 3x10         Ther Activity                                       Gait Training                                       Modalities                        "

## 2024-06-11 ENCOUNTER — TELEPHONE (OUTPATIENT)
Dept: SLEEP CENTER | Facility: HOSPITAL | Age: 59
End: 2024-06-11

## 2024-06-11 NOTE — PROGRESS NOTES
Home Sleep Study Documentation    HOME STUDY DEVICE: Noxturnal no                                           Pat G3 yes      Pre-Sleep Home Study:    Set-up and instructions performed by: Makayla    Technician performed demonstration for Patient: yes    Return demonstration performed by Patient: yes    Written instructions provided to Patient: yes    Patient signed consent form: yes        Post-Sleep Home Study:    Failed HST - No pulse ox signal most of the study.    Home Sleep Study Failed:yes:     Failure reason: Failed Study sensor     Reported or Detected: detected    Scored by: JACKY Adams

## 2024-06-17 ENCOUNTER — OFFICE VISIT (OUTPATIENT)
Dept: PHYSICAL THERAPY | Facility: CLINIC | Age: 59
End: 2024-06-17
Payer: COMMERCIAL

## 2024-06-17 DIAGNOSIS — M25.462 EFFUSION OF LEFT KNEE: ICD-10-CM

## 2024-06-17 DIAGNOSIS — M25.562 ACUTE PAIN OF LEFT KNEE: Primary | ICD-10-CM

## 2024-06-17 DIAGNOSIS — M17.12 PRIMARY OSTEOARTHRITIS OF LEFT KNEE: ICD-10-CM

## 2024-06-17 PROCEDURE — 97110 THERAPEUTIC EXERCISES: CPT | Performed by: PHYSICAL THERAPIST

## 2024-06-17 NOTE — PROGRESS NOTES
"Daily Note     Today's date: 2024  Patient name: Apple King  : 1965  MRN: [id number]  Referring provider: Joey Matamoros MD  Dx:   Encounter Diagnosis     ICD-10-CM    1. Acute pain of left knee  M25.562       2. Primary osteoarthritis of left knee  M17.12       3. Effusion of left knee  M25.462                          Subjective: Patient reports thigh soreness and general aches around the knee but was able play pickle ball 3x/wk.        Objective: See treatment diary below      Assessment: Assessed the knee per new region or soreness in the medial thigh but soreness is mostly noted along the VMO secondary to improved activation.  Progressed resistance and strengthening exercises as per below and updated for her HEP.       Plan: Continue per plan of care.      Precautions:   Patient Active Problem List   Diagnosis    Hypertension    Vaginal candidiasis    Acquired hypothyroidism    Mixed hyperlipidemia     B/L knee OA      Manuals 5/7 5/14 5/21 6/10 6/17                                                            Neuro Re-Ed                                                                                                        Ther Ex             Recumbent bike warm up  8' L1 5' L1 5' L1 5'        Standing gastroc stretch  3x20\" 3x20\" B/L 3x20\" B/L 3x20\" B/L        Self quad stretch in prone 3x20\" 2x20\" 2x20\" 2x20\" 3x20\"        Seated HS stretch 3x20\" 3x20\" 3x20\" 3x20\" 3x20\"        Side clamshells - Blue TB 3x10 B/L 3x10 B/L 3x10 B/L 3x10 B/L 3x10 B/L        Side lying hip abduction w/ top knee bent 3x10 B/L nv 2x10 B/L NV 3x10 B/L leg straight        Leg Press             Wall squat w/ pball  2x10 3x10 3x10 3x10        Monster walks to failure - blue TB  2 laps 2 sets to fatigue 2 sets to fatigue 2 sets to fatigue        Hip 3-way - Blue TB  2x10 OTB 3x10 B/L 3x10 B/L 3x10 B/L        Bridges + abd  OTB 3x10 OTB 3x10 GTB 3x10 BTB 3x10        Ther Activity                                       Gait " Training                                       Modalities

## 2024-06-24 ENCOUNTER — APPOINTMENT (OUTPATIENT)
Dept: PHYSICAL THERAPY | Facility: CLINIC | Age: 59
End: 2024-06-24
Payer: COMMERCIAL

## 2024-06-25 ENCOUNTER — EVALUATION (OUTPATIENT)
Dept: PHYSICAL THERAPY | Facility: CLINIC | Age: 59
End: 2024-06-25
Payer: COMMERCIAL

## 2024-06-25 ENCOUNTER — HOSPITAL ENCOUNTER (OUTPATIENT)
Dept: SLEEP CENTER | Facility: CLINIC | Age: 59
Discharge: HOME | End: 2024-06-25
Payer: COMMERCIAL

## 2024-06-25 DIAGNOSIS — M17.12 ARTHRITIS OF KNEE, LEFT: ICD-10-CM

## 2024-06-25 DIAGNOSIS — M25.562 CHRONIC PAIN OF LEFT KNEE: Primary | ICD-10-CM

## 2024-06-25 DIAGNOSIS — G89.29 CHRONIC PAIN OF LEFT KNEE: Primary | ICD-10-CM

## 2024-06-25 PROCEDURE — G0399 HOME SLEEP TEST/TYPE 3 PORTA: HCPCS

## 2024-06-25 PROCEDURE — 97110 THERAPEUTIC EXERCISES: CPT | Performed by: PHYSICAL THERAPIST

## 2024-06-25 PROCEDURE — 97140 MANUAL THERAPY 1/> REGIONS: CPT | Performed by: PHYSICAL THERAPIST

## 2024-06-25 NOTE — PROGRESS NOTES
Home Sleep Study Documentation    HOME STUDY DEVICE: Noxturnal no                                           Pat G3 yes device # 1      Pre-Sleep Home Study:    Set-up and instructions performed by: Marysol    Technician performed demonstration for Patient: yes    Return demonstration performed by Patient: yes    Written instructions provided to Patient: yes    Patient signed consent form: yes        Post-Sleep Home Study:    Additional comments by Patient:       Home Sleep Study Failed:no:    Failure reason: N/A    Reported or Detected: N/A    Scored by: JACKY Adams

## 2024-06-25 NOTE — PROGRESS NOTES
PT Re-Evaluation  and PT Discharge    Today's date: 2024  Patient name: Apple King  : 1965  MRN: [id number]  Referring provider: Joey Matamoros MD  Dx:   Encounter Diagnosis     ICD-10-CM    1. Chronic pain of left knee  M25.562     G89.29       2. Arthritis of knee, left  M17.12                        Assessment  Impairments: abnormal gait, abnormal muscle tone, abnormal or restricted ROM, abnormal movement, activity intolerance, impaired balance, impaired physical strength, lacks appropriate home exercise program, pain with function and weight-bearing intolerance    Assessment details: Patient is a 59 y.o. year old female who attended physical therapy for 6 treatment sessions regarding left knee pain. Patient reports significant improvement at this time which correlates with FOTO scoring.  Patient has shown improvement throughout PT by demonstrating decreased pain, increased range of motion, increased strength, and improved tolerance to activity.  Secondary to achieving functional goals and independence with comprehensive Home Exercise Program, Apple will be discharged from PT at this time.  Thank you.      Understanding of Dx/Px/POC: excellent     Prognosis: excellent    Goals  STG (4 weeks)  1. Patient will be independent with HEP (MET)  2. Decrease pain at worst by 2 points on NPRS (MET)  3. Patient will demonstrate ability to transition from sit to stand without pain (MET)    LTG (8 weeks)  1. Decrease pain at worst from 4 points on NPRS (MET)  2. Increase left equal to right quad length (PARTIALLY MET)  3. Increase left gluteus medius strength by 1/2 MMT grade for reduced dynamic valgus stress (MET)  4. Patient will demonstrate ability to navigate stairs reciprocally without pain (MET)  5. Increase FOTO score > or equal to expected outcome (MET)    Plan  Patient would benefit from: skilled PT    Planned therapy interventions: joint mobilization, manual therapy, neuromuscular re-education,  patient education, strengthening, stretching, therapeutic exercise, home exercise program, ADL training and balance    Frequency: 1x week  Duration in weeks: 8  Treatment plan discussed with: patient      Subjective Evaluation    History of Present Illness  Mechanism of injury: Patient reports to outpatient PT secondary to the onset of acute left knee pain.  Patient states that the pain began while on a cruise 6 weeks prior; noting that she performed lots of steps.  Patient describes the pain as achy and stiff which is worse with prolonged straightening, bending, stair navigation (Descending) and occasional popping which is improved with OTC medications and CBD cream.  Patient had an appointment with Dr. Matamoros on 2024 and received a CSI with 50-75% improvement but clicking and buckling remains.  Patient is retired but notes difficulty with ADL's and leisure functions as a result.  Patients goals for PT are to decrease the pain and return to PLOF and get more active now that she is retired.  Patient also wishes to gain knowledge regarding a gym program for continued benefit regarding B/L knee OA.     2024: Patient reports significant overall improvement since IE and has resumed pickle ball without difficulty.  Notes that she continues to have difficulty walking at times but other ADL and leisure function tolerance have greatly improved.            Recurrent probem    Quality of life: good    Patient Goals  Patient goals for therapy: increased strength, independence with ADLs/IADLs, return to sport/leisure activities, increased motion and decreased pain    Pain  Current pain ratin  At best pain ratin  At worst pain ratin  Quality: dull ache and tight  Relieving factors: rest and relaxation  Aggravating factors: stair climbing  Progression: worsening        Objective     Active Range of Motion   Left Knee   Flexion: WFL  Extension: WFL    Right Knee   Flexion: WFL  Extension: WFL    Mobility  "  Patellar Mobility:   Left Knee   WFL: medial, lateral, superior and inferior.     Strength/Myotome Testing     Left Knee   Flexion: 4+  Extension: 4+  Quadriceps contraction: good    Right Knee   Flexion: 4+  Extension: 4+  Quadriceps contraction: good    Additional Strength Details  Gluteus medius strengthening:    R = 3-/5, L = 3-/5    6/25/2024: R = 4-/5, L = 4-/5    Tests     Left Knee   Positive Apley's compression.   Negative lateral Jenny and medial Jenny.     Additional Tests Details  (+) Ely    (-) 90-90 HS             Precautions:   Patient Active Problem List   Diagnosis   • Hypertension   • Vaginal candidiasis   • Acquired hypothyroidism   • Mixed hyperlipidemia     B/L knee OA      Manuals 5/7 5/14 5/21 6/10 6/17 6/25       Re-eval      25'                                              Neuro Re-Ed                                                                                                        Ther Ex             Recumbent bike warm up  8' L1 5' L1 5' L1 5'        Standing gastroc stretch  3x20\" 3x20\" B/L 3x20\" B/L 3x20\" B/L        Self quad stretch in prone 3x20\" 2x20\" 2x20\" 2x20\" 3x20\"        Seated HS stretch 3x20\" 3x20\" 3x20\" 3x20\" 3x20\"        Side clamshells - Blue TB 3x10 B/L 3x10 B/L 3x10 B/L 3x10 B/L 3x10 B/L        Side lying hip abduction w/ top knee bent 3x10 B/L nv 2x10 B/L NV 3x10 B/L leg straight        Leg Press             Wall squat w/ pball  2x10 3x10 3x10 3x10        Monster walks to failure - blue TB  2 laps 2 sets to fatigue 2 sets to fatigue 2 sets to fatigue        Hip 3-way - Blue TB  2x10 OTB 3x10 B/L 3x10 B/L 3x10 B/L        Bridges + abd  OTB 3x10 OTB 3x10 GTB 3x10 BTB 3x10        HEP Review      15'                    Ther Activity                                       Gait Training                                       Modalities                                              "

## 2024-06-28 PROBLEM — G47.33 OSA (OBSTRUCTIVE SLEEP APNEA): Status: ACTIVE | Noted: 2024-06-28

## 2024-07-16 ENCOUNTER — APPOINTMENT (OUTPATIENT)
Dept: LAB | Facility: CLINIC | Age: 59
End: 2024-07-16
Payer: COMMERCIAL

## 2024-07-16 DIAGNOSIS — E78.2 MIXED HYPERLIPIDEMIA: ICD-10-CM

## 2024-07-16 DIAGNOSIS — E03.9 ACQUIRED HYPOTHYROIDISM: ICD-10-CM

## 2024-07-16 LAB
ALBUMIN SERPL BCG-MCNC: 4 G/DL (ref 3.5–5)
ALP SERPL-CCNC: 51 U/L (ref 34–104)
ALT SERPL W P-5'-P-CCNC: 17 U/L (ref 7–52)
ANION GAP SERPL CALCULATED.3IONS-SCNC: 8 MMOL/L (ref 4–13)
AST SERPL W P-5'-P-CCNC: 21 U/L (ref 13–39)
BILIRUB SERPL-MCNC: 0.65 MG/DL (ref 0.2–1)
BUN SERPL-MCNC: 13 MG/DL (ref 5–25)
CALCIUM SERPL-MCNC: 9.2 MG/DL (ref 8.4–10.2)
CHLORIDE SERPL-SCNC: 104 MMOL/L (ref 96–108)
CHOLEST SERPL-MCNC: 221 MG/DL
CO2 SERPL-SCNC: 27 MMOL/L (ref 21–32)
CREAT SERPL-MCNC: 0.87 MG/DL (ref 0.6–1.3)
GFR SERPL CREATININE-BSD FRML MDRD: 72 ML/MIN/1.73SQ M
GLUCOSE P FAST SERPL-MCNC: 77 MG/DL (ref 65–99)
HDLC SERPL-MCNC: 62 MG/DL
LDLC SERPL CALC-MCNC: 143 MG/DL (ref 0–100)
POTASSIUM SERPL-SCNC: 4.1 MMOL/L (ref 3.5–5.3)
PROT SERPL-MCNC: 6.4 G/DL (ref 6.4–8.4)
SODIUM SERPL-SCNC: 139 MMOL/L (ref 135–147)
TRIGL SERPL-MCNC: 81 MG/DL
TSH SERPL DL<=0.05 MIU/L-ACNC: 3.76 UIU/ML (ref 0.45–4.5)

## 2024-07-16 PROCEDURE — 80061 LIPID PANEL: CPT

## 2024-07-16 PROCEDURE — 84443 ASSAY THYROID STIM HORMONE: CPT

## 2024-07-16 PROCEDURE — 80053 COMPREHEN METABOLIC PANEL: CPT

## 2024-07-16 PROCEDURE — 36415 COLL VENOUS BLD VENIPUNCTURE: CPT

## 2024-07-17 ENCOUNTER — RA CDI HCC (OUTPATIENT)
Dept: OTHER | Facility: HOSPITAL | Age: 59
End: 2024-07-17

## 2024-07-17 NOTE — PROGRESS NOTES
HCC coding opportunities       Chart reviewed, no opportunity found: CHART REVIEWED, NO OPPORTUNITY FOUND        Patients Insurance        Commercial Insurance: [employer]

## 2024-07-22 ENCOUNTER — TELEPHONE (OUTPATIENT)
Dept: SLEEP CENTER | Facility: CLINIC | Age: 59
End: 2024-07-22

## 2024-07-22 NOTE — TELEPHONE ENCOUNTER
Called patient and advised sleep study resulted and shows mild sleep apnea.      Ordering provider Dr. Quinones, PCP, to provide follow up and patient has appointment scheduled 7/24/24.

## 2024-07-24 ENCOUNTER — OFFICE VISIT (OUTPATIENT)
Dept: FAMILY MEDICINE CLINIC | Facility: CLINIC | Age: 59
End: 2024-07-24
Payer: COMMERCIAL

## 2024-07-24 VITALS
BODY MASS INDEX: 31.21 KG/M2 | DIASTOLIC BLOOD PRESSURE: 80 MMHG | SYSTOLIC BLOOD PRESSURE: 118 MMHG | TEMPERATURE: 98.2 F | HEART RATE: 67 BPM | WEIGHT: 169.6 LBS | OXYGEN SATURATION: 97 % | HEIGHT: 62 IN

## 2024-07-24 DIAGNOSIS — I10 HYPERTENSION, UNSPECIFIED TYPE: ICD-10-CM

## 2024-07-24 DIAGNOSIS — H91.90 HEARING LOSS, UNSPECIFIED HEARING LOSS TYPE, UNSPECIFIED LATERALITY: ICD-10-CM

## 2024-07-24 DIAGNOSIS — E03.9 ACQUIRED HYPOTHYROIDISM: ICD-10-CM

## 2024-07-24 DIAGNOSIS — E78.2 MIXED HYPERLIPIDEMIA: Primary | ICD-10-CM

## 2024-07-24 DIAGNOSIS — G47.33 OSA (OBSTRUCTIVE SLEEP APNEA): ICD-10-CM

## 2024-07-24 PROCEDURE — 99214 OFFICE O/P EST MOD 30 MIN: CPT | Performed by: FAMILY MEDICINE

## 2024-07-24 RX ORDER — LEVOTHYROXINE SODIUM 88 MCG
TABLET ORAL
Qty: 90 TABLET | Refills: 1 | Status: SHIPPED | OUTPATIENT
Start: 2024-07-24

## 2024-07-24 RX ORDER — ROSUVASTATIN CALCIUM 10 MG/1
10 TABLET, COATED ORAL DAILY
Qty: 90 TABLET | Refills: 1 | Status: SHIPPED | OUTPATIENT
Start: 2024-07-24

## 2024-07-24 NOTE — ASSESSMENT & PLAN NOTE
Lipids with only slight improvement since last labs 6 months ago.  Will start rosuvastatin 10 mg daily.

## 2024-07-24 NOTE — PROGRESS NOTES
Ambulatory Visit  Name: Apple King      : 1965      MRN: [id number]  Encounter Provider: Fidel Quinones DO  Encounter Date: 2024   Encounter department: [facility]    Assessment & Plan   1. Mixed hyperlipidemia  Assessment & Plan:  Lipids with only slight improvement since last labs 6 months ago.  Will start rosuvastatin 10 mg daily.  Orders:  -     rosuvastatin (CRESTOR) 10 MG tablet; Take 1 tablet (10 mg total) by mouth daily  -     Comprehensive metabolic panel; Future  -     Lipid Panel with Direct LDL reflex; Future  2. Acquired hypothyroidism  Assessment & Plan:  TSH normal.  Continue levothyroxine at 88 mcg daily  Orders:  -     TSH, 3rd generation; Future  3. Hearing loss, unspecified hearing loss type, unspecified laterality  -     Ambulatory Referral to Otolaryngology; Future  4. Hypertension, unspecified type  Assessment & Plan:  Blood pressure stable on lisinopril 5 mg daily  5. ALEKSEY (obstructive sleep apnea)  Assessment & Plan:  Patient has mild sleep apnea.  Based on her scores she does not necessarily require CPAP.  She is working with her TMJ specialist with a jaw advancement device.  I counseled her to do this very gradually even slower than their recommendation to ensure she can adapt to wearing it without any side effects.  She will start initially anywhere from 2 to 4 hours per night at the lowest setting for at least several weeks before attempting to increase.       History of Present Illness     Patient was seen for follow-up of chronic medical problems.  She is being treated for hyperlipidemia, hypertension and hypothyroidism.  She also recently completed a home study for sleep apnea which did show mild sleep apnea.  She is currently working with a TMJ specialist to wear a oral appliance to help manage her jaw symptoms as well as her sleep apnea.  She is having some difficulty adjusting to the appliance.      Review of Systems   Constitutional:  Negative.    Respiratory: Negative.     Cardiovascular: Negative.    Gastrointestinal: Negative.    Genitourinary: Negative.    Musculoskeletal: Negative.    Psychiatric/Behavioral:  Positive for sleep disturbance.      Past Medical History:   Diagnosis Date   • Hypothyroidism    • Rosacea      Past Surgical History:   Procedure Laterality Date   • BLADDER SURGERY  2018   • TRIGGER FINGER RELEASE Right 02/27/2023     Family History   Problem Relation Age of Onset   • Hypothyroidism Mother    • Anemia Mother    • Lung cancer Mother    • Hypertension Mother    • Diabetes Brother    • Emphysema Brother    • Pancreatitis Brother      Social History     Tobacco Use   • Smoking status: Never     Passive exposure: Never   • Smokeless tobacco: Never   Vaping Use   • Vaping status: Never Used   Substance and Sexual Activity   • Alcohol use: Not Currently   • Drug use: Never   • Sexual activity: Not on file     Current Outpatient Medications on File Prior to Visit   Medication Sig   • ALPRAZolam (XANAX) 0.25 mg tablet Take 1 tablet (0.25 mg total) by mouth 2 (two) times a day as needed for anxiety   • Calcium-Vitamins C & D (CALCIUM/C/D PO) Take by mouth   • estradiol (ESTRACE VAGINAL) 0.1 mg/g vaginal cream Twice weekly   • fluticasone (FLONASE) 50 mcg/act nasal spray 1 spray into each nostril   • L-Lysine 500 MG TABS Take by mouth daily   • levothyroxine (Synthroid) 88 mcg tablet Take 1 tablet (88 mcg total) by mouth daily Brand medically necessary   • lisinopril (ZESTRIL) 5 mg tablet 2.5 mg   • Multiple Vitamins-Minerals (MULTIVITAL PO) Take by mouth   • Cholecalciferol 50 MCG (2000 UT) CAPS Take by mouth (Patient not taking: Reported on 7/24/2024)   • valACYclovir (VALTREX) 1,000 mg tablet Take 2 tablets (2,000 mg total) by mouth 2 (two) times a day for 1 day     Allergies   Allergen Reactions   • Minocycline Other (See Comments)   • Prednisone Other (See Comments)     Joint Aches      • Tetracyclines & Related Other (See  "Comments)     Joint aches      Immunization History   Administered Date(s) Administered   • INFLUENZA 11/17/2022     Objective     /80   Pulse 67   Temp 98.2 °F (36.8 °C)   Ht 5' 2\" (1.575 m)   Wt 76.9 kg (169 lb 9.6 oz)   SpO2 97%   BMI 31.02 kg/m²     Physical Exam  Vitals and nursing note reviewed.   Constitutional:       General: She is not in acute distress.     Appearance: Normal appearance.   HENT:      Head: Normocephalic and atraumatic.   Eyes:      Pupils: Pupils are equal, round, and reactive to light.   Cardiovascular:      Rate and Rhythm: Normal rate and regular rhythm.      Pulses: Normal pulses.      Heart sounds: Normal heart sounds.   Pulmonary:      Effort: Pulmonary effort is normal.      Breath sounds: Normal breath sounds.   Musculoskeletal:         General: Normal range of motion.      Cervical back: Normal range of motion.   Skin:     General: Skin is warm and dry.   Neurological:      General: No focal deficit present.      Mental Status: She is alert and oriented to person, place, and time. Mental status is at baseline.   Psychiatric:         Mood and Affect: Mood normal.         Behavior: Behavior normal.         Thought Content: Thought content normal.         Judgment: Judgment normal.         "

## 2024-07-24 NOTE — ASSESSMENT & PLAN NOTE
Patient has mild sleep apnea.  Based on her scores she does not necessarily require CPAP.  She is working with her TMJ specialist with a jaw advancement device.  I counseled her to do this very gradually even slower than their recommendation to ensure she can adapt to wearing it without any side effects.  She will start initially anywhere from 2 to 4 hours per night at the lowest setting for at least several weeks before attempting to increase.

## 2024-09-22 DIAGNOSIS — I10 HYPERTENSION, UNSPECIFIED TYPE: Primary | ICD-10-CM

## 2024-09-23 RX ORDER — LISINOPRIL 5 MG/1
2.5 TABLET ORAL DAILY
Qty: 45 TABLET | Refills: 3 | Status: SHIPPED | OUTPATIENT
Start: 2024-09-23

## 2024-10-02 ENCOUNTER — OFFICE VISIT (OUTPATIENT)
Dept: PODIATRY | Facility: CLINIC | Age: 59
End: 2024-10-02
Payer: COMMERCIAL

## 2024-10-02 DIAGNOSIS — M79.671 RIGHT FOOT PAIN: ICD-10-CM

## 2024-10-02 DIAGNOSIS — M72.2 PLANTAR FASCIITIS: Primary | ICD-10-CM

## 2024-10-02 PROCEDURE — 20550 NJX 1 TENDON SHEATH/LIGAMENT: CPT | Performed by: PODIATRIST

## 2024-10-02 PROCEDURE — 99214 OFFICE O/P EST MOD 30 MIN: CPT | Performed by: PODIATRIST

## 2024-10-02 RX ORDER — TRIAMCINOLONE ACETONIDE 40 MG/ML
20 INJECTION, SUSPENSION INTRA-ARTICULAR; INTRAMUSCULAR
Status: SHIPPED | OUTPATIENT
Start: 2024-10-02

## 2024-10-02 RX ORDER — BUPIVACAINE HYDROCHLORIDE 2.5 MG/ML
0.5 INJECTION, SOLUTION INFILTRATION; PERINEURAL
Status: SHIPPED | OUTPATIENT
Start: 2024-10-02

## 2024-10-02 RX ORDER — MELOXICAM 15 MG/1
15 TABLET ORAL DAILY
Qty: 60 TABLET | Refills: 0 | Status: SHIPPED | OUTPATIENT
Start: 2024-10-02 | End: 2024-12-01

## 2024-10-02 RX ORDER — LIDOCAINE HYDROCHLORIDE 10 MG/ML
1 INJECTION, SOLUTION INFILTRATION; PERINEURAL
Status: SHIPPED | OUTPATIENT
Start: 2024-10-02

## 2024-10-02 RX ADMIN — TRIAMCINOLONE ACETONIDE 20 MG: 40 INJECTION, SUSPENSION INTRA-ARTICULAR; INTRAMUSCULAR at 17:15

## 2024-10-02 RX ADMIN — BUPIVACAINE HYDROCHLORIDE 0.5 ML: 2.5 INJECTION, SOLUTION INFILTRATION; PERINEURAL at 17:15

## 2024-10-02 RX ADMIN — LIDOCAINE HYDROCHLORIDE 1 ML: 10 INJECTION, SOLUTION INFILTRATION; PERINEURAL at 17:15

## 2024-10-02 NOTE — PROGRESS NOTES
Ambulatory Visit  Name: Apple King      : 1965      MRN: [id number]  Encounter Provider: David Lott DPM  Encounter Date: 10/2/2024   Encounter department: [facility] [place]    Explained the patient that her symptoms are consistent with plantars fasciitis of the right foot.  Discussed etiology and treatment options.  Advised her on stretching exercises.  Avoiding being barefoot.  Injected right heel with 0.5 cc Kenalog 40 along with 1 cc 1% Xylocaine and 1 cc 0.5% Marcaine.  Prescribed meloxicam 15 mg daily for 60 days.  Reappoint 5 weeks.    Assessment & Plan  Plantar fasciitis    Orders:    meloxicam (Mobic) 15 mg tablet; Take 1 tablet (15 mg total) by mouth daily    Right foot pain           History of Present Illness     Apple King is a 59 y.o. female who presents with right heel pain of approximate 2 months duration.  Pain began without recalled trauma but patient notes that she is playing pickle ball on a regular basis.  Initially, symptoms of post static dyskinesia were present but now there is pain with each step.  Noted left heel pain is  related.  Patient wears orthotics comfortably and regularly.      Review of Systems   Gastrointestinal: Negative.    Musculoskeletal:  Positive for gait problem.               Objective     There were no vitals taken for this visit.    Physical Exam  Constitutional:       Appearance: Normal appearance.   Cardiovascular:      Pulses: Normal pulses.   Musculoskeletal:         General: Tenderness present.      Comments: Pain with palpation medial aspect right heel at fascia insertion into calcaneus.  Also pain present proximally.   Skin:     General: Skin is warm.   Neurological:      General: No focal deficit present.      Mental Status: She is oriented to person, place, and time.      Comments: Tinel's sign is negative for tarsal tunnel syndrome right foot.       Foot/lower extremity injection    Performed by: David Lott  NANCY  Authorized by: David Lott DPM    Procedure:     Other Assisting Provider: No      Verbal consent obtained?: Yes      Risks and benefits: Risks, benefits and alternatives were discussed      Consent given by:  Patient    Patient states understanding of procedure being performed: Yes      Patient identity confirmed:  Verbally with patient    Supporting Documentation:     Indications:  Pain    Procedure Details:                Ethyl Chloride was applied      Needle size: 25 G G    Ultrasound Guidance: no      Approach:  Medial    Laterality:  Right    Cyst Aspiration/Injection: No      Location: aponeurosis      Aponeurosis Structures: Plantar fascia origin      Injection Information:       Medications:  1 mL lidocaine 1 %; 0.5 mL bupivacaine 0.25 %; 20 mg triamcinolone acetonide 40 mg/mL

## 2024-10-03 DIAGNOSIS — E78.2 MIXED HYPERLIPIDEMIA: ICD-10-CM

## 2024-10-03 RX ORDER — ROSUVASTATIN CALCIUM 10 MG/1
10 TABLET, COATED ORAL DAILY
Qty: 90 TABLET | Refills: 1 | Status: SHIPPED | OUTPATIENT
Start: 2024-10-03

## 2024-10-11 ENCOUNTER — TELEPHONE (OUTPATIENT)
Age: 59
End: 2024-10-11

## 2024-10-11 NOTE — TELEPHONE ENCOUNTER
Clinic Note    Patient Name: Violeta Juan  : 1968  MRN: [id number]    Chief Complaint   Patient presents with    Edema     Bilateral legs. States goes up into hips. Noticed about a 1 week.        HPI:    Ms. Violeta Juan is a 54 y.o. female who presents to clinic today with CC of swelling in bilateral legs X 7-10 days. Denies associated SOB.  BP is significantly elevated today. She admits to a h/o HTN. Reports that she was previously on amlodipine but has not taken it in 1-2 years due to running out. States she did not have the money to keep running back to the doctor.  Patient denies HA, blurry vision, CP, SOB, numbness, tingling, and weakness.   Patient is, otherwise, without complaints.     Medications: none       Allergies: Patient has no known allergies.      Past Medical History:    History reviewed. No pertinent past medical history.    Past Surgical History:    History reviewed. No pertinent surgical history.      Social History:    Social History     Tobacco Use   Smoking Status Not on file   Smokeless Tobacco Not on file     Social History     Substance and Sexual Activity   Alcohol Use None     Social History     Substance and Sexual Activity   Drug Use Not on file         Family History:    History reviewed. No pertinent family history.    Review of Systems:    Review of Systems   Constitutional:  Negative for appetite change, chills, fatigue, fever and unexpected weight change.   Eyes:  Negative for visual disturbance.   Respiratory:  Negative for cough and shortness of breath.    Cardiovascular:  Positive for leg swelling. Negative for chest pain.   Gastrointestinal:  Negative for abdominal pain, change in bowel habit, constipation, diarrhea, nausea, vomiting and change in bowel habit.   Musculoskeletal:  Negative for arthralgias.   Integumentary:  Negative for rash.   Neurological:  Negative for dizziness and headaches.   Psychiatric/Behavioral:  The patient is not nervous/anxious.      I spoke to patient and added her on schedule for Dr. Lott on 11/4/24 @ 3:15pm   "    Vitals:    Vitals:    09/06/23 1649 09/06/23 1653 09/06/23 1704   BP: (!) 193/124 (!) 210/115 (!) 210/118   BP Location: Left arm Left arm Left arm   Patient Position: Sitting Sitting Sitting   BP Method: X-Large (Automatic) X-Large (Manual) X-Large (Manual)   Pulse: 68     Resp: 20     Temp: 97.7 °F (36.5 °C)     TempSrc: Oral     SpO2: 98%     Weight: 119.4 kg (263 lb 3.2 oz)     Height: 5' 2" (1.575 m)         Body mass index is 48.14 kg/m².    Wt Readings from Last 3 Encounters:   09/06/23 1649 119.4 kg (263 lb 3.2 oz)        Physical Exam:    Physical Exam  Constitutional:       General: She is not in acute distress.     Appearance: Normal appearance. She is obese.   HENT:      Nose: Nose normal.      Mouth/Throat:      Mouth: Mucous membranes are moist.      Pharynx: Oropharynx is clear.   Eyes:      Conjunctiva/sclera: Conjunctivae normal.   Cardiovascular:      Rate and Rhythm: Normal rate and regular rhythm.      Heart sounds: Normal heart sounds. No murmur heard.  Pulmonary:      Effort: Pulmonary effort is normal. No respiratory distress.      Breath sounds: Normal breath sounds. No wheezing, rhonchi or rales.   Abdominal:      General: Bowel sounds are normal.      Palpations: Abdomen is soft.      Tenderness: There is no abdominal tenderness.   Musculoskeletal:      Cervical back: Neck supple.      Comments: + trace BLE edema   Skin:     Findings: No rash.   Neurological:      General: No focal deficit present.      Mental Status: She is alert. Mental status is at baseline.   Psychiatric:         Mood and Affect: Mood normal.       Assessment/Plan:   1. Essential hypertension  -     amLODIPine (NORVASC) 5 MG tablet; Take 1 tablet (5 mg total) by mouth once daily.  Dispense: 30 tablet; Refill: 0- new medication. Risks/benefits/potential side effects/black box warning reviewed and discussed with patient.   -     CBC Auto Differential; Future; Expected date: 09/06/2023  -     Comprehensive Metabolic " Panel; Future; Expected date: 09/06/2023  -     Lipid Panel; Future; Expected date: 09/06/2023  -     Microalbumin/Creatinine Ratio, Urine  - Patient previously took amlodipine but has been out of it for 1-2 years. I suspect her blood pressure is chronically uncontrolled. She is asymptomatic and declined ER. I advised if she developed HA, blurry vision, chest pain, SOB, weakness, numbness/tingling, etc to go to ER. She voiced understanding.  - She reports she does have a way to monitor at home. Advised to monitor closely and call with persistently elevated readings.   - DASH diet.    2. Localized edema  -     furosemide (LASIX) 20 MG tablet; Take 1 tablet (20 mg total) by mouth once daily.  Dispense: 30 tablet; Refill: 0- new medication. Risks/benefits/potential side effects/black box warning reviewed and discussed with patient.   -     CBC Auto Differential; Future; Expected date: 09/06/2023  -     Comprehensive Metabolic Panel; Future; Expected date: 09/06/2023  -     Microalbumin/Creatinine Ratio, Urine    3. Screening for diabetes mellitus  -     Hemoglobin A1C; Future; Expected date: 09/06/2023         Active Problem List with Overview Notes    Diagnosis Date Noted    Essential hypertension 09/06/2023        RTC in 1 week for follow up on HTN and swelling.  RTC sooner if symptoms worsen or fail to resolve.  Patient voiced understanding and is agreeable to plan.      Betzaida Ayala MD    Family Medicine

## 2024-10-11 NOTE — TELEPHONE ENCOUNTER
Caller: Apple     Doctor and/or Office: Dr Lott /Patrizia     #: [phone]    Escalation: Appointment Patient was seen 10/2  she needs a 6 week f/u appt per Dr Lott.  Thank you

## 2024-11-06 ENCOUNTER — OFFICE VISIT (OUTPATIENT)
Dept: PODIATRY | Facility: CLINIC | Age: 59
End: 2024-11-06
Payer: COMMERCIAL

## 2024-11-06 VITALS — WEIGHT: 170 LBS | RESPIRATION RATE: 18 BRPM | BODY MASS INDEX: 31.09 KG/M2

## 2024-11-06 DIAGNOSIS — M79.671 RIGHT FOOT PAIN: ICD-10-CM

## 2024-11-06 DIAGNOSIS — M72.2 PLANTAR FASCIITIS: Primary | ICD-10-CM

## 2024-11-06 PROCEDURE — 20550 NJX 1 TENDON SHEATH/LIGAMENT: CPT | Performed by: PODIATRIST

## 2024-11-06 PROCEDURE — RECHECK: Performed by: PODIATRIST

## 2024-11-06 RX ORDER — TRIAMCINOLONE ACETONIDE 40 MG/ML
20 INJECTION, SUSPENSION INTRA-ARTICULAR; INTRAMUSCULAR
Status: SHIPPED | OUTPATIENT
Start: 2024-11-06

## 2024-11-06 RX ORDER — BUPIVACAINE HYDROCHLORIDE 2.5 MG/ML
1 INJECTION, SOLUTION EPIDURAL; INFILTRATION; INTRACAUDAL
Status: SHIPPED | OUTPATIENT
Start: 2024-11-06

## 2024-11-06 RX ORDER — LIDOCAINE HYDROCHLORIDE 10 MG/ML
1 INJECTION, SOLUTION INFILTRATION; PERINEURAL
Status: SHIPPED | OUTPATIENT
Start: 2024-11-06

## 2024-11-06 RX ADMIN — TRIAMCINOLONE ACETONIDE 20 MG: 40 INJECTION, SUSPENSION INTRA-ARTICULAR; INTRAMUSCULAR at 15:15

## 2024-11-06 RX ADMIN — LIDOCAINE HYDROCHLORIDE 1 ML: 10 INJECTION, SOLUTION INFILTRATION; PERINEURAL at 15:15

## 2024-11-06 RX ADMIN — BUPIVACAINE HYDROCHLORIDE 1 ML: 2.5 INJECTION, SOLUTION EPIDURAL; INFILTRATION; INTRACAUDAL at 15:15

## 2024-11-06 NOTE — PROGRESS NOTES
Patient presents for assessment of right heel pain.  At last visit, patient was diagnosed with plantars fasciitis.  Cortisone injection was provided along with meloxicam 15 mg daily.    Patient noted significant improvement but mild pain persists slightly proximal to where the first injection was given.    Patient also relates mild left forefoot pain over the past 1 week with no trauma noted.    Treatment: Reinjected right heel with 0.5 cc Kenalog 40 along with 1 cc 1% Xylocaine and 1 cc 0.5% Marcaine.  Patient told to continue with meloxicam.  Reappoint 6 weeks.    Foot/lower extremity injection    Performed by: David Lott DPM  Authorized by: David Lott DPM    Procedure:     Other Assisting Provider: No      Verbal consent obtained?: Yes      Risks and benefits: Risks, benefits and alternatives were discussed      Consent given by:  Patient    Patient states understanding of procedure being performed: Yes      Patient identity confirmed:  Verbally with patient    Supporting Documentation:     Indications:  Pain    Procedure Details:                Ethyl Chloride was applied      Needle size: 25 G G    Ultrasound Guidance: no      Approach:  Medial    Laterality:  Right    Location: aponeurosis      Injection Information:       Medications:  1 mL bupivacaine (PF) 0.25 %; 1 mL lidocaine 1 %; 20 mg triamcinolone acetonide 40 mg/mL

## 2024-11-22 ENCOUNTER — OFFICE VISIT (OUTPATIENT)
Dept: OBGYN CLINIC | Facility: MEDICAL CENTER | Age: 59
End: 2024-11-22
Payer: COMMERCIAL

## 2024-11-22 VITALS
HEART RATE: 68 BPM | HEIGHT: 62 IN | BODY MASS INDEX: 31.28 KG/M2 | SYSTOLIC BLOOD PRESSURE: 116 MMHG | WEIGHT: 170 LBS | DIASTOLIC BLOOD PRESSURE: 78 MMHG

## 2024-11-22 DIAGNOSIS — M17.12 PRIMARY OSTEOARTHRITIS OF LEFT KNEE: ICD-10-CM

## 2024-11-22 DIAGNOSIS — M25.562 CHRONIC PAIN OF LEFT KNEE: Primary | ICD-10-CM

## 2024-11-22 DIAGNOSIS — G89.29 CHRONIC PAIN OF LEFT KNEE: Primary | ICD-10-CM

## 2024-11-22 PROCEDURE — 20610 DRAIN/INJ JOINT/BURSA W/O US: CPT | Performed by: EMERGENCY MEDICINE

## 2024-11-22 PROCEDURE — 99213 OFFICE O/P EST LOW 20 MIN: CPT | Performed by: EMERGENCY MEDICINE

## 2024-11-22 RX ORDER — TRIAMCINOLONE ACETONIDE 40 MG/ML
40 INJECTION, SUSPENSION INTRA-ARTICULAR; INTRAMUSCULAR
Status: COMPLETED | OUTPATIENT
Start: 2024-11-22 | End: 2024-11-22

## 2024-11-22 RX ORDER — ROPIVACAINE HYDROCHLORIDE 2 MG/ML
4 INJECTION, SOLUTION EPIDURAL; INFILTRATION; PERINEURAL
Status: COMPLETED | OUTPATIENT
Start: 2024-11-22 | End: 2024-11-22

## 2024-11-22 RX ADMIN — ROPIVACAINE HYDROCHLORIDE 4 ML: 2 INJECTION, SOLUTION EPIDURAL; INFILTRATION; PERINEURAL at 12:30

## 2024-11-22 RX ADMIN — TRIAMCINOLONE ACETONIDE 40 MG: 40 INJECTION, SUSPENSION INTRA-ARTICULAR; INTRAMUSCULAR at 12:30

## 2024-11-22 NOTE — PROGRESS NOTES
Assessment/Plan:    Diagnoses and all orders for this visit:    Chronic pain of left knee  -     Large joint arthrocentesis: L knee    Primary osteoarthritis of left knee  -     Large joint arthrocentesis: L knee    Repeat CSI  Discussed Visco    Return in about 3 months (around 2/22/2025).      Subjective:   Patient ID: Apple King is a 59 y.o. female.    Apple returns s/p Left knee CSI 5/2024 with significant relief until recently    Initial note:  Apple presents for about a month of Left knee pain.  She notes posterior pain and stiffness and anterior lateral clicking.  She recently went on a cruise and did a lot of stairs.    She recently received Right CSI with benefit      Review of Systems    The following portions of the patient's chart were reviewed and updated as appropriate:   Allergy:    Allergies   Allergen Reactions    Minocycline Other (See Comments)    Prednisone Other (See Comments)     Joint Aches       Tetracyclines & Related Other (See Comments)     Joint aches        Medications:    Current Outpatient Medications:     ALPRAZolam (XANAX) 0.25 mg tablet, Take 1 tablet (0.25 mg total) by mouth 2 (two) times a day as needed for anxiety, Disp: 30 tablet, Rfl: 0    Calcium-Vitamins C & D (CALCIUM/C/D PO), Take by mouth, Disp: , Rfl:     estradiol (ESTRACE VAGINAL) 0.1 mg/g vaginal cream, Twice weekly, Disp: , Rfl:     fluticasone (FLONASE) 50 mcg/act nasal spray, 1 spray into each nostril, Disp: , Rfl:     L-Lysine 500 MG TABS, Take by mouth daily, Disp: , Rfl:     lisinopril (ZESTRIL) 5 mg tablet, Take 0.5 tablets (2.5 mg total) by mouth daily, Disp: 45 tablet, Rfl: 3    Multiple Vitamins-Minerals (MULTIVITAL PO), Take by mouth, Disp: , Rfl:     rosuvastatin (CRESTOR) 10 MG tablet, TAKE 1 TABLET (10 MG TOTAL) BY MOUTH DAILY, Disp: 90 tablet, Rfl: 1    Synthroid 88 MCG tablet, TAKE 1 TABLET (88 MCG TOTAL) BY MOUTH DAILY BRAND MEDICALLY NECESSARY, Disp: 90 tablet, Rfl: 1    Cholecalciferol 50  "MCG (2000 UT) CAPS, Take by mouth (Patient not taking: Reported on 7/24/2024), Disp: , Rfl:     meloxicam (Mobic) 15 mg tablet, Take 1 tablet (15 mg total) by mouth daily (Patient not taking: Reported on 11/22/2024), Disp: 60 tablet, Rfl: 0    valACYclovir (VALTREX) 1,000 mg tablet, Take 2 tablets (2,000 mg total) by mouth 2 (two) times a day for 1 day, Disp: 12 tablet, Rfl: 1    Current Facility-Administered Medications:     bupivacaine (MARCAINE) 0.25 % injection 0.5 mL, 0.5 mL, Infiltration, , , 0.5 mL at 10/02/24 1715    bupivacaine (PF) (MARCAINE) 0.25 % injection 1 mL, 1 mL, Infiltration, , , 1 mL at 11/06/24 1515    lidocaine (XYLOCAINE) 1 % injection 1 mL, 1 mL, Infiltration, , , 1 mL at 10/02/24 1715    lidocaine (XYLOCAINE) 1 % injection 1 mL, 1 mL, Infiltration, , , 1 mL at 11/06/24 1515    triamcinolone acetonide (KENALOG-40) 40 mg/mL injection 20 mg, 20 mg, Infiltration, , , 20 mg at 10/02/24 1715    triamcinolone acetonide (Kenalog-40) 40 mg/mL injection 20 mg, 20 mg, Infiltration, , , 20 mg at 11/06/24 1515    Patient Active Problem List   Diagnosis    Hypertension    Vaginal candidiasis    Acquired hypothyroidism    Mixed hyperlipidemia    ALEKSEY (obstructive sleep apnea)       Objective:  /78   Pulse 68   Ht 5' 2\" (1.575 m)   Wt 77.1 kg (170 lb)   BMI 31.09 kg/m²     Left Knee Exam     Other   Erythema: absent            Physical Exam      Neurologic Exam    Large joint arthrocentesis: L knee  Universal Protocol:  Consent: Verbal consent obtained.  Risks and benefits: risks, benefits and alternatives were discussed  Consent given by: patient  Time out: Immediately prior to procedure a \"time out\" was called to verify the correct patient, procedure, equipment, support staff and site/side marked as required.  Timeout called at: 11/22/2024 1:19 PM.  Patient understanding: patient states understanding of the procedure being performed  Test results: test results available and properly labeled  Site " marked: the operative site was marked  Patient identity confirmed: verbally with patient  Supporting Documentation  Indications: pain   Procedure Details  Location: knee - L knee  Preparation: Patient was prepped and draped in the usual sterile fashion  Needle size: 22 G  Ultrasound guidance: no  Approach: anterolateral  Medications administered: 40 mg triamcinolone acetonide 40 mg/mL; 4 mL ropivacaine 0.2 %    Patient tolerance: patient tolerated the procedure well with no immediate complications  Dressing:  Sterile dressing applied    No erythema of knee(s)          I have personally reviewed the written report of the pertinent studies.             Past Medical History:   Diagnosis Date    Hypothyroidism     Rosacea        Past Surgical History:   Procedure Laterality Date    BLADDER SURGERY  2018    TRIGGER FINGER RELEASE Right 02/27/2023       Social History     Socioeconomic History    Marital status: /Civil Union     Spouse name: Not on file    Number of children: Not on file    Years of education: Not on file    Highest education level: Not on file   Occupational History    Not on file   Tobacco Use    Smoking status: Never     Passive exposure: Never    Smokeless tobacco: Never   Vaping Use    Vaping status: Never Used   Substance and Sexual Activity    Alcohol use: Not Currently    Drug use: Never    Sexual activity: Not on file   Other Topics Concern    Not on file   Social History Narrative    Not on file     Social Drivers of Health     Financial Resource Strain: Not on file   Food Insecurity: Not on file   Transportation Needs: Not on file   Physical Activity: Not on file   Stress: Not on file   Social Connections: Not on file   Intimate Partner Violence: Not on file   Housing Stability: Not on file       Family History   Problem Relation Age of Onset    Hypothyroidism Mother     Anemia Mother     Lung cancer Mother     Hypertension Mother     Diabetes Brother     Emphysema Brother      Pancreatitis Brother

## 2024-11-22 NOTE — PATIENT INSTRUCTIONS
While taking Mobic (meloxicam) do not take any other NSAIDs such as Advil, Motrin, ibuprofen, Celebrex, diclofenac, naproxen or Aleve.  However you may take Tylenol (acetaminophen).  You may also take Tylenol 500mg every 4-6 hours as needed OR max 1,000mg per dose up to 3 times per day for a total of 3,000mg per day     OR    You may use Advil (ibuprofen) 400-600mg every 6 hours or at least twice per day (OR Aleve (naproxen) 250-500mg every 12 hours as needed for pain and inflammation).  However do not mix or take other NSAIDs together such as Advil, Motrin, ibuprofen, Celebrex, naproxen, diclofenac or Aleve.    You may also take Tylenol (acetaminophen) together with Advil (ibuprofen) or Aleve (naproxen) as this is safe and can help decrease your pain levels.  The dosing for Tylenol is 500mg every 4-6 hours as needed OR max 1,000mg per dose up to 3 times per day for a total of 3,000mg per day  *Check with your primary care physician to see if these medications are safe to take and to make sure they do not interfere with your other medications and medical issues.

## 2024-11-26 DIAGNOSIS — M72.2 PLANTAR FASCIITIS: ICD-10-CM

## 2024-11-27 RX ORDER — MELOXICAM 15 MG/1
15 TABLET ORAL DAILY
Qty: 60 TABLET | Refills: 2 | Status: SHIPPED | OUTPATIENT
Start: 2024-11-27 | End: 2025-01-26

## 2025-01-02 DIAGNOSIS — M25.562 CHRONIC PAIN OF LEFT KNEE: Primary | ICD-10-CM

## 2025-01-02 DIAGNOSIS — M17.12 PRIMARY OSTEOARTHRITIS OF LEFT KNEE: ICD-10-CM

## 2025-01-02 DIAGNOSIS — G89.29 CHRONIC PAIN OF LEFT KNEE: Primary | ICD-10-CM

## 2025-01-08 ENCOUNTER — CLINICAL SUPPORT (OUTPATIENT)
Dept: NUTRITION | Facility: HOSPITAL | Age: 60
End: 2025-01-08
Payer: COMMERCIAL

## 2025-01-08 DIAGNOSIS — M25.562 ACUTE PAIN OF LEFT KNEE: ICD-10-CM

## 2025-01-08 DIAGNOSIS — M17.12 PRIMARY OSTEOARTHRITIS OF LEFT KNEE: ICD-10-CM

## 2025-01-08 DIAGNOSIS — E66.811 CLASS 1 OBESITY WITH BODY MASS INDEX (BMI) OF 31.0 TO 31.9 IN ADULT, UNSPECIFIED OBESITY TYPE, UNSPECIFIED WHETHER SERIOUS COMORBIDITY PRESENT: ICD-10-CM

## 2025-01-08 DIAGNOSIS — M25.462 EFFUSION OF LEFT KNEE: ICD-10-CM

## 2025-01-08 PROCEDURE — 97802 MEDICAL NUTRITION INDIV IN: CPT

## 2025-01-08 NOTE — PROGRESS NOTES
Nutrition Assessment Form    Patient Name: Apple King    YOB: 1965    Sex: Female     Assessment Date: 1/8/2025  Start Time: 10:20 Stop Time: 11:30 Total Minutes: 70     Data:  Present at session: self   Parent/Patient Concerns/reason for visit: Bad eating habits, slight HTN, overweight   Medical Dx/Reason for Referral: E66.811, Z68.31 Class 1 obesity, BMI 31-31.9.   Past Medical History:   Diagnosis Date    Hypothyroidism     Rosacea        Current Outpatient Medications   Medication Sig Dispense Refill    ALPRAZolam (XANAX) 0.25 mg tablet Take 1 tablet (0.25 mg total) by mouth 2 (two) times a day as needed for anxiety 30 tablet 0    Calcium-Vitamins C & D (CALCIUM/C/D PO) Take by mouth      Cholecalciferol 50 MCG (2000 UT) CAPS Take by mouth (Patient not taking: Reported on 7/24/2024)      estradiol (ESTRACE VAGINAL) 0.1 mg/g vaginal cream Twice weekly      fluticasone (FLONASE) 50 mcg/act nasal spray 1 spray into each nostril      L-Lysine 500 MG TABS Take by mouth daily      lisinopril (ZESTRIL) 5 mg tablet Take 0.5 tablets (2.5 mg total) by mouth daily 45 tablet 3    meloxicam (MOBIC) 15 mg tablet TAKE 1 TABLET (15 MG TOTAL) BY MOUTH DAILY 60 tablet 2    Multiple Vitamins-Minerals (MULTIVITAL PO) Take by mouth      rosuvastatin (CRESTOR) 10 MG tablet TAKE 1 TABLET (10 MG TOTAL) BY MOUTH DAILY 90 tablet 1    Synthroid 88 MCG tablet TAKE 1 TABLET (88 MCG TOTAL) BY MOUTH DAILY BRAND MEDICALLY NECESSARY 90 tablet 1    valACYclovir (VALTREX) 1,000 mg tablet Take 2 tablets (2,000 mg total) by mouth 2 (two) times a day for 1 day 12 tablet 1     Current Facility-Administered Medications   Medication Dose Route Frequency Provider Last Rate Last Admin    bupivacaine (MARCAINE) 0.25 % injection 0.5 mL  0.5 mL Infiltration     0.5 mL at 10/02/24 1715    bupivacaine (PF) (MARCAINE) 0.25 % injection 1 mL  1 mL Infiltration     1 mL at 11/06/24 1515    lidocaine (XYLOCAINE) 1 % injection 1 mL  1 mL  "Infiltration     1 mL at 10/02/24 1715    lidocaine (XYLOCAINE) 1 % injection 1 mL  1 mL Infiltration     1 mL at 11/06/24 1515    triamcinolone acetonide (KENALOG-40) 40 mg/mL injection 20 mg  20 mg Infiltration     20 mg at 10/02/24 1715    triamcinolone acetonide (Kenalog-40) 40 mg/mL injection 20 mg  20 mg Infiltration     20 mg at 11/06/24 1515        Additional Meds/Supplements: Cran action. Lysine, Vit C, MVI calcium   Special Learning Needs/barriers to learning/any new barriers none   Height: Ht Readings from Last 5 Encounters:   11/22/24 5' 2\" (1.575 m)   09/12/24 5' 2\" (1.575 m)   07/24/24 5' 2\" (1.575 m)   05/01/24 5' 2.5\" (1.588 m)   03/20/24 5' 2.5\" (1.588 m)      Weight: Wt Readings from Last 10 Encounters:   11/22/24 77.1 kg (170 lb)   11/06/24 77.1 kg (170 lb)   09/12/24 77.1 kg (170 lb)   07/24/24 76.9 kg (169 lb 9.6 oz)   05/01/24 79.8 kg (176 lb)   03/20/24 78.9 kg (174 lb)   01/24/24 80 kg (176 lb 6.4 oz)   01/22/24 80.3 kg (177 lb)   12/04/23 79.4 kg (175 lb)   11/15/23 78.9 kg (174 lb)     Estimated body mass index is 31.09 kg/m² as calculated from the following:    Height as of 11/22/24: 5' 2\" (1.575 m).    Weight as of 11/22/24: 77.1 kg (170 lb).   Recent Weight Change: [x]Yes     []No  Amount: Gain about 3 pounds over holidays      Energy Needs: 1835 maintain (-500,  1333 kcal to lose    Allergies   Allergen Reactions    Minocycline Other (See Comments)    Prednisone Other (See Comments)     Joint Aches       Tetracyclines & Related Other (See Comments)     Joint aches     or intolerances    Social History     Substance and Sexual Activity   Alcohol Use Not Currently    _____0__x/wk or month  1 or 2 or 3 or 4 or____ drinks/session   Mixed drinks/ wine/ beer   Social History     Tobacco Use   Smoking Status Never    Passive exposure: Never   Smokeless Tobacco Never       Who shops? patient   Who cooks/cooking methods/Eating out/take out habits   patient  Cooking methods: bake/castaneda/air " castaneda/grill/boil/other___all_____    Take out: __1_ x/wk or month   Dining out __1-2__ x/wk or month   Exercise: Walk/ run/ bike/ gym/elliptical/other _________  ____ x/wk how many minutes:______   strength training  Currently no exercise due to knee issues, plantar fascitis. Going to start swimming, will try stationary bike.      Other: ie: Sleep habits/ stress level/ work habits household-lives with ?/ food security Poor sleep , restless, trouble going to sleep. Menapause hot flashes wake up.   Prior Nutritional Counseling? []Yes     [x]No  When:      Why:         Diet Hx:  Breakfast:  Scrambled egg with something in it sausage, Guatemalan blue, pepper onion, minimal butter. Decaf tea with small milk, fruit, or english muffin IF there is something on the table, or counter will eat as she goes by. Nuts, cookies, anything. Tries to put berries out.       Tries to drink 32-64 ounces of water each day.    Lunch:  Activia yogurt with fruit, kind granola(cinnamon oat) most days.   Leftovers, peanut butter and jelly some days. , butternut squash soup.      Only drinks water and tea    Grazes on whatever is in the kitchen. Dennise muffins , if she has them in the morning, she craves sugar all day.    Dinner:  Family, pasta and meatballs,   Meat and carb, family will only eat janis salad, steamed broccoli  Wings Mostly meat, potato and vegetable with butter and salt.          Snacks:    Other Notes/ Initial Assessment:  Apple's knees are getting too bad to enjoy the activity - pickleball she enjoyed very much. The doctor told her to lose weight. She had lost weight in the past on [employer] but had tragic life event that altered her lifestyle for a period and she could not eat the way she wanted to due to living in a  hotel.   Apple stated she is a grazer, she just picks at anything that is around, knows she is not hungry and eats anything. Has tried putting up notes on frig, pictures of her rolls of fat. Does not matter.  She  said she has no self control.   Mindful eating discussed. Paying attention to hunger, focus on why you eat. Eating meals without your phone. When going through the kitchen and picking up food as you pass through, stop and sit down if eating something. Put a portion of nuts, berries, cookies on table and sit, rather than eating while continuing the activity you are in the middle of.     Discussed exercise, swimming, trying a bike to assess tolerance while her knee and foot are healing.     Encouraged life long eating habits, lifestyle change , not a temporary diet.                  Nutrition Diagnosis:   Overweight related to calorie intake exceeding calorie expenditure,  as evidenced by BMI 31, patient interview.                     Medical Nutrition Therapy Intervention:  []Individualized Meal Plan []Understanding Lab Values   []Basic Pathophysiology of Disease []Food/Medication Interactions   []Food Diary [x]Exercise   [x]Lifestyle/Behavior Modification Techniques []Medication, Mechanism of Action   []Label Reading: CHO/ Na/ Fat/ other_________ []Self Blood Glucose Monitoring   [x]Weight/BMI Goals: She would like to get down to 130-135 lbs.  [x]Other - Portion Book, Heart healthy fat choices handouts. 1500 Thaddeus meal plan samples          Comprehension: []Excellent  [x]Very Good  []Good  []Fair   []Poor    Receptivity: []Excellent  [x]Very Good  []Good  []Fair   []Poor    Expected Compliance: []Excellent  [x]Very Good  []Good  []Fair   []Poor        Goals (initial)/ Progress made on previous goals/new goals:  Focus on Mindful eating, sit down and do no other activity while eating any food during the day.  Focus on eating to satisfy hunger and not just to taste something you like.    2.  Aim to increase fruit and vegetable intake, snack on raw veg with low thaddeus dip.    3. Follow MyPlate method when portioning foods, reference portion book for sizes when serving your meal.   Get back to light, low impact exercising,  "swim, try bike or elyptical as tolerated.        No follow-ups on file.  Labs:  CMP  Lab Results   Component Value Date    K 4.1 07/16/2024     07/16/2024    CO2 27 07/16/2024    BUN 13 07/16/2024    CREATININE 0.87 07/16/2024    GLUF 77 07/16/2024    CALCIUM 9.2 07/16/2024    AST 21 07/16/2024    ALT 17 07/16/2024    ALKPHOS 51 07/16/2024    EGFR 72 07/16/2024       BMP  Lab Results   Component Value Date    CALCIUM 9.2 07/16/2024    K 4.1 07/16/2024    CO2 27 07/16/2024     07/16/2024    BUN 13 07/16/2024    CREATININE 0.87 07/16/2024       Lipids  No results found for: \"CHOL\"  Lab Results   Component Value Date    HDL 62 07/16/2024    HDL 68 01/18/2024     Lab Results   Component Value Date    LDLCALC 143 (H) 07/16/2024    LDLCALC 138 (H) 01/18/2024     Lab Results   Component Value Date    TRIG 81 07/16/2024    TRIG 85 01/18/2024     No results found for: \"CHOLHDL\"    Hemoglobin A1C  Lab Results   Component Value Date    HGBA1C 5.3 04/14/2023       Fasting Glucose  Lab Results   Component Value Date    GLUF 77 07/16/2024       Insulin     Thyroid  No results found for: \"TSH\", \"Y7JKTYU\", \"I2WMLFJ\", \"THYROIDAB\"    Hepatic Function Panel  Lab Results   Component Value Date    ALT 17 07/16/2024    AST 21 07/16/2024    ALKPHOS 51 07/16/2024       Celiac Disease Antibody Panel  No results found for: \"ENDOMYSIAL IGA\", \"GLIADIN IGA\", \"GLIADIN IGG\", \"IGA\", \"TISSUE TRANSGLUT AB\", \"TTG IGA\"   Iron  No results found for: \"IRON\", \"TIBC\", \"FERRITIN\"         Mariana Norton RD  [facility]IN[facility]UTR[facility]  [address] [facility] [facility] [address] [phone]    "

## 2025-01-17 ENCOUNTER — OFFICE VISIT (OUTPATIENT)
Dept: CARDIOLOGY CLINIC | Facility: CLINIC | Age: 60
End: 2025-01-17
Payer: COMMERCIAL

## 2025-01-17 VITALS
WEIGHT: 173 LBS | HEIGHT: 62 IN | SYSTOLIC BLOOD PRESSURE: 130 MMHG | OXYGEN SATURATION: 97 % | DIASTOLIC BLOOD PRESSURE: 82 MMHG | HEART RATE: 80 BPM | BODY MASS INDEX: 31.83 KG/M2

## 2025-01-17 DIAGNOSIS — I10 HYPERTENSION, UNSPECIFIED TYPE: Primary | ICD-10-CM

## 2025-01-17 DIAGNOSIS — E78.2 MIXED HYPERLIPIDEMIA: ICD-10-CM

## 2025-01-17 DIAGNOSIS — I49.3 PVC (PREMATURE VENTRICULAR CONTRACTION): ICD-10-CM

## 2025-01-17 PROCEDURE — 99204 OFFICE O/P NEW MOD 45 MIN: CPT | Performed by: INTERNAL MEDICINE

## 2025-01-17 PROCEDURE — 93000 ELECTROCARDIOGRAM COMPLETE: CPT | Performed by: INTERNAL MEDICINE

## 2025-01-17 RX ORDER — METOPROLOL SUCCINATE 25 MG/1
25 TABLET, EXTENDED RELEASE ORAL DAILY
Qty: 90 TABLET | Refills: 3 | Status: SHIPPED | OUTPATIENT
Start: 2025-01-17

## 2025-01-17 NOTE — PROGRESS NOTES
Cardiology   Apple King 59 y.o. female MRN: [id number]        Impression:  PVCs - will start low dose b-blockers.  Hypertension - controlled.  Dyslipidemia - on statin.     Recommendations:  Start Metoprolol Succinate 25mg daily in evening.  Continue remainder of medications.  Follow up in 4 months.       HPI: Apple King is a 59 y.o. year old female with remote history of syncope, dyslipidemia, and hypertension who presents for evaluation of palpitations.  Had Zio monitor which demonstrated premature ventricular contractions.  Is symptomatic.  No chest pain or shortness of breath.  Only happens when she lies down.           Review of Systems   Constitutional: Negative.    HENT: Negative.     Eyes: Negative.    Respiratory:  Negative for chest tightness and shortness of breath.    Cardiovascular:  Positive for palpitations. Negative for chest pain and leg swelling.   Gastrointestinal: Negative.    Endocrine: Negative.    Genitourinary: Negative.    Musculoskeletal: Negative.    Skin: Negative.    Allergic/Immunologic: Negative.    Neurological:  Positive for speech difficulty.   Hematological: Negative.    Psychiatric/Behavioral: Negative.     All other systems reviewed and are negative.        Past Medical History:   Diagnosis Date    Hypothyroidism     Rosacea      Past Surgical History:   Procedure Laterality Date    BLADDER SURGERY  2018    TRIGGER FINGER RELEASE Right 02/27/2023     Social History     Substance and Sexual Activity   Alcohol Use Not Currently     Social History     Substance and Sexual Activity   Drug Use Never     Social History     Tobacco Use   Smoking Status Never    Passive exposure: Never   Smokeless Tobacco Never     Family History   Problem Relation Age of Onset    Hypothyroidism Mother     Anemia Mother     Lung cancer Mother     Hypertension Mother     Diabetes Brother     Emphysema Brother     Pancreatitis Brother        Allergies:  Allergies   Allergen Reactions     Minocycline Other (See Comments)    Prednisone Other (See Comments)     Joint Aches       Tetracyclines & Related Other (See Comments)     Joint aches        Medications:     Current Outpatient Medications:     ALPRAZolam (XANAX) 0.25 mg tablet, Take 1 tablet (0.25 mg total) by mouth 2 (two) times a day as needed for anxiety, Disp: 30 tablet, Rfl: 0    Calcium-Vitamins C & D (CALCIUM/C/D PO), Take by mouth, Disp: , Rfl:     estradiol (ESTRACE VAGINAL) 0.1 mg/g vaginal cream, Twice weekly, Disp: , Rfl:     fluticasone (FLONASE) 50 mcg/act nasal spray, 1 spray into each nostril, Disp: , Rfl:     L-Lysine 500 MG TABS, Take by mouth daily, Disp: , Rfl:     lisinopril (ZESTRIL) 5 mg tablet, Take 0.5 tablets (2.5 mg total) by mouth daily, Disp: 45 tablet, Rfl: 3    metoprolol succinate (TOPROL-XL) 25 mg 24 hr tablet, Take 1 tablet (25 mg total) by mouth daily, Disp: 90 tablet, Rfl: 3    Multiple Vitamins-Minerals (MULTIVITAL PO), Take by mouth, Disp: , Rfl:     omeprazole (PriLOSEC) 20 mg delayed release capsule, Take 20 mg by mouth daily, Disp: , Rfl:     rosuvastatin (CRESTOR) 10 MG tablet, TAKE 1 TABLET (10 MG TOTAL) BY MOUTH DAILY, Disp: 90 tablet, Rfl: 1    Synthroid 88 MCG tablet, TAKE 1 TABLET (88 MCG TOTAL) BY MOUTH DAILY BRAND MEDICALLY NECESSARY, Disp: 90 tablet, Rfl: 1    Cholecalciferol 50 MCG (2000 UT) CAPS, Take by mouth (Patient not taking: Reported on 7/24/2024), Disp: , Rfl:     meloxicam (MOBIC) 15 mg tablet, TAKE 1 TABLET (15 MG TOTAL) BY MOUTH DAILY (Patient not taking: Reported on 1/17/2025), Disp: 60 tablet, Rfl: 2    valACYclovir (VALTREX) 1,000 mg tablet, Take 2 tablets (2,000 mg total) by mouth 2 (two) times a day for 1 day, Disp: 12 tablet, Rfl: 1    Current Facility-Administered Medications:     bupivacaine (MARCAINE) 0.25 % injection 0.5 mL, 0.5 mL, Infiltration, , , 0.5 mL at 10/02/24 1715    bupivacaine (PF) (MARCAINE) 0.25 % injection 1 mL, 1 mL, Infiltration, , , 1 mL at 11/06/24 1165     lidocaine (XYLOCAINE) 1 % injection 1 mL, 1 mL, Infiltration, , , 1 mL at 10/02/24 1715    lidocaine (XYLOCAINE) 1 % injection 1 mL, 1 mL, Infiltration, , , 1 mL at 11/06/24 1515    triamcinolone acetonide (KENALOG-40) 40 mg/mL injection 20 mg, 20 mg, Infiltration, , , 20 mg at 10/02/24 1715    triamcinolone acetonide (Kenalog-40) 40 mg/mL injection 20 mg, 20 mg, Infiltration, , , 20 mg at 11/06/24 1515  bupivacaine, 0.5 mL,   bupivacaine (PF), 1 mL,   lidocaine, 1 mL,   lidocaine, 1 mL,   triamcinolone acetonide, 20 mg,   triamcinolone acetonide, 20 mg,         Wt Readings from Last 3 Encounters:   01/17/25 78.5 kg (173 lb)   11/22/24 77.1 kg (170 lb)   11/06/24 77.1 kg (170 lb)     Temp Readings from Last 3 Encounters:   07/24/24 98.2 °F (36.8 °C)   01/24/24 97.5 °F (36.4 °C)   12/22/23 99.7 °F (37.6 °C) (Tympanic)     BP Readings from Last 3 Encounters:   01/17/25 130/82   11/22/24 116/78   09/12/24 122/76     Pulse Readings from Last 3 Encounters:   01/17/25 80   11/22/24 68   09/12/24 68         Physical Exam  HENT:      Head: Atraumatic.      Mouth/Throat:      Mouth: Mucous membranes are moist.   Eyes:      Extraocular Movements: Extraocular movements intact.   Cardiovascular:      Rate and Rhythm: Normal rate and regular rhythm.      Heart sounds:      Friction rub present.   Pulmonary:      Effort: Pulmonary effort is normal.      Breath sounds: Normal breath sounds.   Abdominal:      Palpations: Abdomen is soft.   Musculoskeletal:         General: Normal range of motion.      Cervical back: Normal range of motion and neck supple.   Skin:     General: Skin is warm.   Neurological:      General: No focal deficit present.      Mental Status: She is alert and oriented to person, place, and time.   Psychiatric:         Mood and Affect: Mood normal.         Behavior: Behavior normal.           Laboratory Studies:  CMP:  Lab Results   Component Value Date    K 4.1 07/16/2024     07/16/2024    CO2 27  "07/16/2024    BUN 13 07/16/2024    CREATININE 0.87 07/16/2024    AST 21 07/16/2024    ALT 17 07/16/2024    EGFR 72 07/16/2024       Lipid Profile:   No results found for: \"CHOL\"  Lab Results   Component Value Date    HDL 62 07/16/2024     Lab Results   Component Value Date    LDLCALC 143 (H) 07/16/2024     Lab Results   Component Value Date    TRIG 81 07/16/2024       Cardiac testing:   EKG reviewed personally: NSR 80 [id number]          "

## 2025-01-17 NOTE — PATIENT INSTRUCTIONS
Recommendations:  Start Metoprolol Succinate 25mg daily in evening.  Continue remainder of medications.  Follow up in 4 months.

## 2025-01-18 DIAGNOSIS — B00.1 RECURRENT COLD SORES: ICD-10-CM

## 2025-01-20 RX ORDER — VALACYCLOVIR HYDROCHLORIDE 1 G/1
2000 TABLET, FILM COATED ORAL 2 TIMES DAILY
Qty: 12 TABLET | Refills: 1 | Status: SHIPPED | OUTPATIENT
Start: 2025-01-20 | End: 2025-01-21

## 2025-01-22 ENCOUNTER — HOSPITAL ENCOUNTER (OUTPATIENT)
Facility: MEDICAL CENTER | Age: 60
Discharge: HOME | End: 2025-01-22
Payer: COMMERCIAL

## 2025-01-22 DIAGNOSIS — M17.12 PRIMARY OSTEOARTHRITIS OF LEFT KNEE: ICD-10-CM

## 2025-01-22 DIAGNOSIS — M25.562 CHRONIC PAIN OF LEFT KNEE: ICD-10-CM

## 2025-01-22 DIAGNOSIS — G89.29 CHRONIC PAIN OF LEFT KNEE: ICD-10-CM

## 2025-01-22 PROCEDURE — 73721 MRI JNT OF LWR EXTRE W/O DYE: CPT

## 2025-01-24 ENCOUNTER — APPOINTMENT (OUTPATIENT)
Dept: LAB | Facility: CLINIC | Age: 60
End: 2025-01-24
Payer: COMMERCIAL

## 2025-01-24 DIAGNOSIS — E03.9 ACQUIRED HYPOTHYROIDISM: ICD-10-CM

## 2025-01-24 DIAGNOSIS — E78.2 MIXED HYPERLIPIDEMIA: ICD-10-CM

## 2025-01-24 LAB
ALBUMIN SERPL BCG-MCNC: 4.2 G/DL (ref 3.5–5)
ALP SERPL-CCNC: 132 U/L (ref 34–104)
ALT SERPL W P-5'-P-CCNC: 44 U/L (ref 7–52)
ANION GAP SERPL CALCULATED.3IONS-SCNC: 5 MMOL/L (ref 4–13)
AST SERPL W P-5'-P-CCNC: 43 U/L (ref 13–39)
BILIRUB SERPL-MCNC: 0.41 MG/DL (ref 0.2–1)
BUN SERPL-MCNC: 11 MG/DL (ref 5–25)
CALCIUM SERPL-MCNC: 9.9 MG/DL (ref 8.4–10.2)
CHLORIDE SERPL-SCNC: 101 MMOL/L (ref 96–108)
CHOLEST SERPL-MCNC: 138 MG/DL (ref ?–200)
CO2 SERPL-SCNC: 32 MMOL/L (ref 21–32)
CREAT SERPL-MCNC: 0.78 MG/DL (ref 0.6–1.3)
GFR SERPL CREATININE-BSD FRML MDRD: 82 ML/MIN/1.73SQ M
GLUCOSE P FAST SERPL-MCNC: 81 MG/DL (ref 65–99)
HDLC SERPL-MCNC: 68 MG/DL
LDLC SERPL CALC-MCNC: 55 MG/DL (ref 0–100)
POTASSIUM SERPL-SCNC: 4.3 MMOL/L (ref 3.5–5.3)
PROT SERPL-MCNC: 7.7 G/DL (ref 6.4–8.4)
SODIUM SERPL-SCNC: 138 MMOL/L (ref 135–147)
TRIGL SERPL-MCNC: 73 MG/DL (ref ?–150)
TSH SERPL DL<=0.05 MIU/L-ACNC: 3.31 UIU/ML (ref 0.45–4.5)

## 2025-01-24 PROCEDURE — 80053 COMPREHEN METABOLIC PANEL: CPT

## 2025-01-24 PROCEDURE — 80061 LIPID PANEL: CPT

## 2025-01-24 PROCEDURE — 36415 COLL VENOUS BLD VENIPUNCTURE: CPT

## 2025-01-24 PROCEDURE — 84443 ASSAY THYROID STIM HORMONE: CPT

## 2025-01-27 ENCOUNTER — OFFICE VISIT (OUTPATIENT)
Dept: FAMILY MEDICINE CLINIC | Facility: CLINIC | Age: 60
End: 2025-01-27
Payer: COMMERCIAL

## 2025-01-27 VITALS
OXYGEN SATURATION: 99 % | HEART RATE: 52 BPM | BODY MASS INDEX: 30.51 KG/M2 | WEIGHT: 172.2 LBS | SYSTOLIC BLOOD PRESSURE: 120 MMHG | TEMPERATURE: 97.8 F | HEIGHT: 63 IN | DIASTOLIC BLOOD PRESSURE: 86 MMHG

## 2025-01-27 DIAGNOSIS — I10 HYPERTENSION, UNSPECIFIED TYPE: ICD-10-CM

## 2025-01-27 DIAGNOSIS — E03.9 ACQUIRED HYPOTHYROIDISM: ICD-10-CM

## 2025-01-27 DIAGNOSIS — Z12.11 SCREEN FOR COLON CANCER: ICD-10-CM

## 2025-01-27 DIAGNOSIS — E78.2 MIXED HYPERLIPIDEMIA: ICD-10-CM

## 2025-01-27 DIAGNOSIS — R74.01 ELEVATED TRANSAMINASE LEVEL: Primary | ICD-10-CM

## 2025-01-27 DIAGNOSIS — Z13.0 SCREENING FOR DEFICIENCY ANEMIA: ICD-10-CM

## 2025-01-27 DIAGNOSIS — G47.33 OSA (OBSTRUCTIVE SLEEP APNEA): ICD-10-CM

## 2025-01-27 PROCEDURE — 99214 OFFICE O/P EST MOD 30 MIN: CPT | Performed by: FAMILY MEDICINE

## 2025-01-27 NOTE — PROGRESS NOTES
Name: Apple King      : 1965      MRN: [id number]  Encounter Provider: Fidel Quinones DO  Encounter Date: 2025   Encounter department: [facility]    Assessment & Plan  Acquired hypothyroidism  TSH normal.  Continue levothyroxine 88 mcg daily       Hypertension, unspecified type  Blood pressure controlled on lisinopril 5 mg daily       Mixed hyperlipidemia  Lipids much improved with LDL cholesterol now less than 70.  Continue rosuvastatin 10 mg daily  Orders:  •  Comprehensive metabolic panel; Future  •  Lipid Panel with Direct LDL reflex; Future  •  TSH, 3rd generation with Free T4 reflex; Future    ALEKSEY (obstructive sleep apnea)  Continue with CPAP.       Screening for deficiency anemia    Orders:  •  CBC and differential; Future    Elevated transaminase level    Orders:  •  US abdomen limited; Future    Screen for colon cancer    Orders:  •  Ambulatory referral to Gastroenterology; Future         History of Present Illness     Patient was seen for routine follow-up of chronic medical problems.  She is being treated for hyperlipidemia, hypertension and hypothyroidism.      Review of Systems   Constitutional: Negative.    Respiratory: Negative.     Cardiovascular: Negative.    Gastrointestinal: Negative.    Genitourinary: Negative.    Musculoskeletal: Negative.    Psychiatric/Behavioral: Negative.       Past Medical History:   Diagnosis Date   • Hypothyroidism    • Rosacea      Past Surgical History:   Procedure Laterality Date   • BLADDER SURGERY  2018   • TRIGGER FINGER RELEASE Right 2023     Family History   Problem Relation Age of Onset   • Hypothyroidism Mother    • Anemia Mother    • Lung cancer Mother    • Hypertension Mother    • Diabetes Brother    • Emphysema Brother    • Pancreatitis Brother      Social History     Tobacco Use   • Smoking status: Never     Passive exposure: Never   • Smokeless tobacco: Never   Vaping Use   • Vaping status: Never Used    Substance and Sexual Activity   • Alcohol use: Not Currently   • Drug use: Never   • Sexual activity: Not on file     Current Outpatient Medications on File Prior to Visit   Medication Sig   • ALPRAZolam (XANAX) 0.25 mg tablet Take 1 tablet (0.25 mg total) by mouth 2 (two) times a day as needed for anxiety   • Calcium-Vitamins C & D (CALCIUM/C/D PO) Take by mouth   • estradiol (ESTRACE VAGINAL) 0.1 mg/g vaginal cream Twice weekly   • fluticasone (FLONASE) 50 mcg/act nasal spray 1 spray into each nostril   • L-Lysine 500 MG TABS Take by mouth daily   • lisinopril (ZESTRIL) 5 mg tablet Take 0.5 tablets (2.5 mg total) by mouth daily   • Multiple Vitamins-Minerals (MULTIVITAL PO) Take by mouth   • omeprazole (PriLOSEC) 20 mg delayed release capsule Take 20 mg by mouth daily   • rosuvastatin (CRESTOR) 10 MG tablet TAKE 1 TABLET (10 MG TOTAL) BY MOUTH DAILY   • Synthroid 88 MCG tablet TAKE 1 TABLET (88 MCG TOTAL) BY MOUTH DAILY BRAND MEDICALLY NECESSARY   • valACYclovir (VALTREX) 1,000 mg tablet Take 2 tablets (2,000 mg total) by mouth 2 (two) times a day for 1 day (Patient taking differently: Take 2,000 mg by mouth if needed)   • Cholecalciferol 50 MCG (2000 UT) CAPS Take by mouth (Patient not taking: Reported on 7/24/2024)   • meloxicam (MOBIC) 15 mg tablet TAKE 1 TABLET (15 MG TOTAL) BY MOUTH DAILY (Patient not taking: Reported on 1/17/2025)   • metoprolol succinate (TOPROL-XL) 25 mg 24 hr tablet Take 1 tablet (25 mg total) by mouth daily (Patient not taking: Reported on 1/27/2025)     Allergies   Allergen Reactions   • Minocycline Other (See Comments)   • Prednisone Other (See Comments)     Joint Aches      • Tetracyclines & Related Other (See Comments)     Joint aches      Immunization History   Administered Date(s) Administered   • COVID-19, unspecified 02/14/2021, 03/14/2021, 01/03/2022   • INFLUENZA 11/17/2022     Objective   /86 (BP Location: Left arm, Patient Position: Sitting, Cuff Size: Standard)    "Pulse (!) 52   Temp 97.8 °F (36.6 °C)   Ht 5' 3.25\" (1.607 m)   Wt 78.1 kg (172 lb 3.2 oz)   SpO2 99%   BMI 30.26 kg/m²     Physical Exam  Vitals and nursing note reviewed.   Constitutional:       General: She is not in acute distress.     Appearance: Normal appearance.   HENT:      Head: Normocephalic and atraumatic.   Eyes:      Pupils: Pupils are equal, round, and reactive to light.   Cardiovascular:      Rate and Rhythm: Normal rate and regular rhythm.      Pulses: Normal pulses.      Heart sounds: Normal heart sounds.   Pulmonary:      Effort: Pulmonary effort is normal.      Breath sounds: Normal breath sounds.   Musculoskeletal:         General: Normal range of motion.      Cervical back: Normal range of motion.   Skin:     General: Skin is warm and dry.   Neurological:      General: No focal deficit present.      Mental Status: She is alert and oriented to person, place, and time. Mental status is at baseline.   Psychiatric:         Mood and Affect: Mood normal.         Behavior: Behavior normal.         Thought Content: Thought content normal.         Judgment: Judgment normal.         "

## 2025-01-27 NOTE — ASSESSMENT & PLAN NOTE
Lipids much improved with LDL cholesterol now less than 70.  Continue rosuvastatin 10 mg daily  Orders:  •  Comprehensive metabolic panel; Future  •  Lipid Panel with Direct LDL reflex; Future  •  TSH, 3rd generation with Free T4 reflex; Future

## 2025-01-29 ENCOUNTER — RESULTS FOLLOW-UP (OUTPATIENT)
Dept: OBGYN CLINIC | Facility: MEDICAL CENTER | Age: 60
End: 2025-01-29

## 2025-02-10 DIAGNOSIS — G89.29 CHRONIC PAIN OF LEFT KNEE: Primary | ICD-10-CM

## 2025-02-10 DIAGNOSIS — M25.562 CHRONIC PAIN OF LEFT KNEE: Primary | ICD-10-CM

## 2025-02-10 DIAGNOSIS — M17.12 PRIMARY OSTEOARTHRITIS OF LEFT KNEE: ICD-10-CM

## 2025-02-10 DIAGNOSIS — E03.9 ACQUIRED HYPOTHYROIDISM: ICD-10-CM

## 2025-02-10 RX ORDER — LEVOTHYROXINE SODIUM 100 MCG
100 TABLET ORAL EVERY OTHER DAY
Qty: 15 TABLET | Refills: 1 | Status: SHIPPED | OUTPATIENT
Start: 2025-02-10 | End: 2025-03-27

## 2025-02-10 RX ORDER — LEVOTHYROXINE SODIUM 88 MCG
88 TABLET ORAL EVERY OTHER DAY
Qty: 45 TABLET | Refills: 3 | Status: SHIPPED | OUTPATIENT
Start: 2025-02-10

## 2025-02-19 ENCOUNTER — EVALUATION (OUTPATIENT)
Dept: PHYSICAL THERAPY | Facility: CLINIC | Age: 60
End: 2025-02-19
Payer: COMMERCIAL

## 2025-02-19 DIAGNOSIS — G89.29 CHRONIC PAIN OF LEFT KNEE: Primary | ICD-10-CM

## 2025-02-19 DIAGNOSIS — M25.562 CHRONIC PAIN OF LEFT KNEE: Primary | ICD-10-CM

## 2025-02-19 DIAGNOSIS — M17.12 PRIMARY OSTEOARTHRITIS OF LEFT KNEE: ICD-10-CM

## 2025-02-19 PROCEDURE — 97110 THERAPEUTIC EXERCISES: CPT | Performed by: PHYSICAL THERAPIST

## 2025-02-19 PROCEDURE — 97161 PT EVAL LOW COMPLEX 20 MIN: CPT | Performed by: PHYSICAL THERAPIST

## 2025-02-19 NOTE — PROGRESS NOTES
PT Evaluation     Today's date: 2025  Patient name: Apple King  : 1965  MRN: [id number]  Referring provider: Joey Matamoros MD  Dx:   Encounter Diagnosis     ICD-10-CM    1. Chronic pain of left knee  M25.562 Ambulatory Referral to Physical Therapy    G89.29       2. Primary osteoarthritis of left knee  M17.12 Ambulatory Referral to Physical Therapy                     Assessment  Impairments: abnormal gait, abnormal muscle tone, abnormal or restricted ROM, abnormal movement, activity intolerance, impaired balance, impaired physical strength, lacks appropriate home exercise program, pain with function and weight-bearing intolerance    Assessment details: Apple King is a 59 y.o. female presenting to physical therapy with chronic left knee pain and presents with pain, decreased range of motion, decreased strength, gait/balance dysfunction, and decreased activity tolerance.  Assessment consistent with medial joint pathology and increased stress secondary to proximal LE weakness.  Secondary to these impairments, patient has increased difficulty performing ADL's and household chores.  Apple would benefit from skilled PT to address these issues and maximize function.  Thank you for the referral.    Understanding of Dx/Px/POC: excellent     Prognosis: excellent    Goals  STG (2 weeks)  1. Patient will be independent with HEP  2. Decrease pain at worst by 2 points on NPRS  3. Patient will demonstrate ability to navigate stairs as needed without pain  LTG (4 weeks)  1. Decrease pain at worst from 4 points on NPRS  2. Increase gluteus medius strength by 1/2 MMT grade B/L to decrease knee valgus stress and control with dynamic movement  3. Patient will demonstrate ability to resume pickle ball with adequate knee stability and minimal to no discomfort  4. Increase FOTO score > or equal to expected outcome    Plan  Patient would benefit from: skilled PT    Planned therapy interventions: joint  mobilization, manual therapy, neuromuscular re-education, patient education, strengthening, stretching, therapeutic exercise, home exercise program, ADL training and balance    Frequency: 1x week  Duration in weeks: 6  Treatment plan discussed with: patient      Subjective Evaluation    History of Present Illness  Mechanism of injury: Patient reports to outpatient PT secondary to chronic left knee pain.  Patient states that the pain began 1 year prior while on a cruise performing a lot of steps and describes the pain as achy and sharp which is worse with prolonged positions including transitions and stair navigation, and walking down hills and improved with rest.  Notes active plantar fasciitis of the right foot which may be effecting the left.  MRI was significant for medial meniscus pathology and medial joint OA.  Patient noted relief from a CSI 1 year prior but minimal relief from recent CSI.  Patient is retired but notes difficulty with ADL's and leisure functions as a result.  Patients goals for PT are to decrease the pain and return to PLOF including pickle ball.           Recurrent probem    Quality of life: good    Patient Goals  Patient goals for therapy: increased strength, independence with ADLs/IADLs, return to sport/leisure activities, increased motion and decreased pain    Pain  Current pain ratin  At best pain ratin  At worst pain ratin  Quality: dull ache, tight and sharp  Relieving factors: rest and relaxation  Aggravating factors: stair climbing  Progression: worsening    Social Support  Stairs in house: yes   Lives in: multiple-level home    Employment status: not working    Diagnostic Tests  MRI studies: abnormal (1. Undersurface posterior horn medial meniscus tear without flipped fragment. 2. Degenerative change including full-thickness medial compartment cartilage loss.)  Treatments  Previous treatment: injection treatment  Current treatment: injection treatment and  "medication        Objective     Tenderness   Left Knee   Tenderness in the medial joint line. No tenderness in the lateral joint line and patellar tendon.     Active Range of Motion   Left Knee   Flexion: 122 degrees with pain  Extension: 0 degrees     Passive Range of Motion   Left Knee   Flexion: 125 degrees with pain  Extension: 0 degrees     Mobility   Patellar Mobility:   Left Knee   Hypomobile: left medial, left lateral, left superior and left inferior    Strength/Myotome Testing     Left Knee   Flexion: 4-  Extension: 4-  Quadriceps contraction: good    Right Knee   Flexion: 4+  Extension: 4+  Quadriceps contraction: good    Additional Strength Details  Gluteus medius:    R = 3+/5  L = 3-/5    Tests     Left Knee   Positive Apley's compression and medial Jenny.   Negative lateral Jenny.              Precautions:   Patient Active Problem List   Diagnosis    Hypertension    Vaginal candidiasis    Acquired hypothyroidism    Mixed hyperlipidemia    ALEKSEY (obstructive sleep apnea)           Manuals 2/19                                                                Neuro Re-Ed                                                                                                        Ther Ex             Recumbent bike warm up             Seated HS stretch 3x20\"            Standing hip abduction - pink TB at feet 3x10 B/L            Supine abduction bridge 3x10            Side clamshells HEP            Squat isometric in front of chair 2 sets            Side steps with band at feet             Leg Press                                       Ther Activity                                       Gait Training                                       Modalities                                            "

## 2025-02-25 ENCOUNTER — HOSPITAL ENCOUNTER (OUTPATIENT)
Dept: ULTRASOUND IMAGING | Facility: MEDICAL CENTER | Age: 60
Discharge: HOME | End: 2025-02-25
Payer: COMMERCIAL

## 2025-02-25 DIAGNOSIS — R74.01 ELEVATED TRANSAMINASE LEVEL: ICD-10-CM

## 2025-02-25 PROCEDURE — 76705 ECHO EXAM OF ABDOMEN: CPT

## 2025-02-26 ENCOUNTER — OFFICE VISIT (OUTPATIENT)
Dept: PODIATRY | Facility: CLINIC | Age: 60
End: 2025-02-26

## 2025-02-26 VITALS — HEIGHT: 63 IN | WEIGHT: 172 LBS | RESPIRATION RATE: 18 BRPM | BODY MASS INDEX: 30.48 KG/M2

## 2025-02-26 DIAGNOSIS — M79.672 LEFT FOOT PAIN: ICD-10-CM

## 2025-02-26 DIAGNOSIS — D36.10 NEUROMA: Primary | ICD-10-CM

## 2025-02-26 NOTE — PROGRESS NOTES
Patient presents for pedal assessment.  Patient relates no right heel pain at this time as the plantar fascial pain is responded to the cortisone injection.  The patient discontinued meloxicam as she did not feel it helpful.    Patient continues to have pain in her left forefoot secondary to a Rust's neuroma.  Discussed treatment options.  Injected third metatarsal interspace left foot with 0.5 cc Kenalog 40 along with 1 cc 1% Xylocaine.  Reappoint 6 weeks.    Nerve block    Date/Time: 2/26/2025 4:00 PM    Performed by: David Lott DPM  Authorized by: David Lott DPM    Patient location:  [facility]  [place] Protocol:  procedure performed by consultantConsent: Verbal consent obtained.  Risks and benefits: risks, benefits and alternatives were discussed  Consent given by: patient  Patient understanding: patient states understanding of the procedure being performed  Patient identity confirmed: verbally with patient    Indications:     Indications:  Pain relief  Location:     Body area:  Lower extremity    Lower extremity nerve:  Digital    Laterality:  Left  Pre-procedure details:     Skin preparation:  Alcohol  Skin anesthesia (see MAR for exact dosages):     Skin anesthesia method:  Local infiltration  Procedure details (see MAR for exact dosages):     Block needle gauge:  25 G    Anesthetic injected:  Lidocaine 1% w/o epi    Steroid injected:  Triamcinolone    Injection procedure:  Anatomic landmarks identified and anatomic landmarks palpated  Post-procedure details:     Outcome:  Anesthesia achieved    Patient tolerance of procedure:  Tolerated well, no immediate complications

## 2025-02-28 ENCOUNTER — RESULTS FOLLOW-UP (OUTPATIENT)
Dept: FAMILY MEDICINE CLINIC | Facility: CLINIC | Age: 60
End: 2025-02-28

## 2025-02-28 ENCOUNTER — OFFICE VISIT (OUTPATIENT)
Dept: PHYSICAL THERAPY | Facility: CLINIC | Age: 60
End: 2025-02-28
Payer: COMMERCIAL

## 2025-02-28 DIAGNOSIS — M17.12 PRIMARY OSTEOARTHRITIS OF LEFT KNEE: ICD-10-CM

## 2025-02-28 DIAGNOSIS — G89.29 CHRONIC PAIN OF LEFT KNEE: Primary | ICD-10-CM

## 2025-02-28 DIAGNOSIS — M25.562 CHRONIC PAIN OF LEFT KNEE: Primary | ICD-10-CM

## 2025-02-28 PROCEDURE — 97110 THERAPEUTIC EXERCISES: CPT | Performed by: PHYSICAL THERAPIST

## 2025-02-28 NOTE — PROGRESS NOTES
"Daily Note     Today's date: 2025  Patient name: Apple King  : 1965  MRN: [id number]  Referring provider: Joey Matamoros MD  Dx:   Encounter Diagnosis     ICD-10-CM    1. Chronic pain of left knee  M25.562     G89.29       2. Primary osteoarthritis of left knee  M17.12                      Subjective: Patient reports ability to resume pickle ball without discomfort.  States that overall her pain is less.      Objective: See treatment diary below      Assessment: Reviewed HEP and modified mechanics with squat isometrics for greater quad/hs activation.  Reviewed leg press for gym routine and discuss HS versus quad bias.  Overall fair tolerance to treatment as patient is making progress toward goals.       Plan: Continue per plan of care.      Precautions:   Patient Active Problem List   Diagnosis    Hypertension    Vaginal candidiasis    Acquired hypothyroidism    Mixed hyperlipidemia    ALEKSEY (obstructive sleep apnea)           Manuals                                                                Neuro Re-Ed                                                                                                        Ther Ex             Recumbent bike warm up  L2 5'           Seated HS stretch 3x20\" 3x20\"           Standing hip abduction - pink TB at feet 3x10 B/L 3x10 B/L           Supine abduction bridge -purple TB 3x10 3x10           Side clamshells - purple TB HEP 3x10 B/L           Squat isometric in front of chair 2 sets 3 sets           Side steps with band at feet - purple band  X2 sets           Leg Press  105# 3x10                                     Ther Activity                                       Gait Training                                       Modalities                                            "

## 2025-03-04 ENCOUNTER — APPOINTMENT (OUTPATIENT)
Dept: PHYSICAL THERAPY | Facility: CLINIC | Age: 60
End: 2025-03-04
Payer: COMMERCIAL

## 2025-03-05 ENCOUNTER — OFFICE VISIT (OUTPATIENT)
Dept: PHYSICAL THERAPY | Facility: CLINIC | Age: 60
End: 2025-03-05
Payer: COMMERCIAL

## 2025-03-05 DIAGNOSIS — M17.12 PRIMARY OSTEOARTHRITIS OF LEFT KNEE: ICD-10-CM

## 2025-03-05 DIAGNOSIS — G89.29 CHRONIC PAIN OF LEFT KNEE: Primary | ICD-10-CM

## 2025-03-05 DIAGNOSIS — M25.562 CHRONIC PAIN OF LEFT KNEE: Primary | ICD-10-CM

## 2025-03-05 PROCEDURE — 97110 THERAPEUTIC EXERCISES: CPT | Performed by: PHYSICAL THERAPIST

## 2025-03-05 NOTE — PROGRESS NOTES
"Daily Note     Today's date: 3/5/2025  Patient name: Apple King  : 1965  MRN: [id number]  Referring provider: Joey Matamoros MD  Dx:   Encounter Diagnosis     ICD-10-CM    1. Chronic pain of left knee  M25.562     G89.29       2. Primary osteoarthritis of left knee  M17.12                      Subjective: Patient reports decreased pain after last session \"Feeling great\".  Continues to be able to perform more activities with less pain.        Objective: See treatment diary below      Assessment: Modified treatment today as patient notes that she accidentally performed her exercises prior to PT thinking her session was another day.  Modified session as noted below which patient tolerated well.       Plan: Continue per plan of care.      Precautions:   Patient Active Problem List   Diagnosis    Hypertension    Vaginal candidiasis    Acquired hypothyroidism    Mixed hyperlipidemia    ALEKSEY (obstructive sleep apnea)           Manuals 2/19 2/28 3/5                                                              Neuro Re-Ed                                                                                                        Ther Ex             Recumbent bike warm up  L2 5' L2 5'          Seated HS stretch 3x20\" 3x20\" 3x20\" stairs          Standing hip abduction - pink TB at feet 3x10 B/L 3x10 B/L           Supine abduction bridge -purple TB 3x10 3x10           Side clamshells - purple TB HEP 3x10 B/L           Squat isometric in front of chair 2 sets 3 sets           Side steps with band at feet - purple band  X2 sets           Leg Press  105# 3x10 115# 3x10          Seated HS curl - green TB   3x10                       Ther Activity                                       Gait Training                                       Modalities                                            "

## 2025-03-07 DIAGNOSIS — E03.9 ACQUIRED HYPOTHYROIDISM: ICD-10-CM

## 2025-03-07 RX ORDER — LEVOTHYROXINE SODIUM 100 MCG
100 TABLET ORAL EVERY OTHER DAY
Qty: 45 TABLET | Refills: 1 | Status: SHIPPED | OUTPATIENT
Start: 2025-03-07 | End: 2025-09-03

## 2025-03-11 ENCOUNTER — APPOINTMENT (OUTPATIENT)
Dept: PHYSICAL THERAPY | Facility: CLINIC | Age: 60
End: 2025-03-11
Payer: COMMERCIAL

## 2025-03-12 ENCOUNTER — OFFICE VISIT (OUTPATIENT)
Dept: PHYSICAL THERAPY | Facility: CLINIC | Age: 60
End: 2025-03-12
Payer: COMMERCIAL

## 2025-03-12 DIAGNOSIS — M17.12 PRIMARY OSTEOARTHRITIS OF LEFT KNEE: ICD-10-CM

## 2025-03-12 DIAGNOSIS — G89.29 CHRONIC PAIN OF LEFT KNEE: Primary | ICD-10-CM

## 2025-03-12 DIAGNOSIS — M25.562 CHRONIC PAIN OF LEFT KNEE: Primary | ICD-10-CM

## 2025-03-12 PROCEDURE — 97110 THERAPEUTIC EXERCISES: CPT | Performed by: PHYSICAL THERAPIST

## 2025-03-12 NOTE — PROGRESS NOTES
"Daily Note     Today's date: 3/12/2025  Patient name: Apple King  : 1965  MRN: [id number]  Referring provider: Joey Matamoros MD  Dx:   Encounter Diagnosis     ICD-10-CM    1. Chronic pain of left knee  M25.562     G89.29       2. Primary osteoarthritis of left knee  M17.12                      Subjective: Patient reports that she continues to make progress and played pickle ball with more skilled players without issue.      Objective: See treatment diary below      Assessment: BL gluteus medius weakness remains requiring cues to improve eccentric control.  Patient tolerating quad and general squats without pain correlating with improved functional mobility with sport.  Overall patient is making progress toward goals.      Plan: Continue per plan of care.      Precautions:   Patient Active Problem List   Diagnosis    Hypertension    Vaginal candidiasis    Acquired hypothyroidism    Mixed hyperlipidemia    ALEKSEY (obstructive sleep apnea)           Manuals 2/19 2/28 3/5 3/12                                                             Neuro Re-Ed                                                                                                        Ther Ex             Recumbent bike warm up  L2 5' L2 5' L2 5'         Seated HS stretch 3x20\" 3x20\" 3x20\" stairs          Standing hip abduction - pink TB at feet 3x10 B/L 3x10 B/L  3x10 B/L         Supine abduction bridge -purple TB 3x10 3x10  3x10         Side clamshells - purple TB HEP 3x10 B/L  3x10 B/L         Squat isometric in front of chair 2 sets 3 sets 3 sets 3 sets         Side steps with band at feet - purple band  X2 sets  X2 sets         Leg Press  105# 3x10 115# 3x10 115# 3x10         Seated HS curl - green TB   3x10                       Ther Activity                                       Gait Training                                       Modalities                                            "

## 2025-03-18 ENCOUNTER — APPOINTMENT (OUTPATIENT)
Dept: PHYSICAL THERAPY | Facility: CLINIC | Age: 60
End: 2025-03-18
Payer: COMMERCIAL

## 2025-03-19 ENCOUNTER — APPOINTMENT (OUTPATIENT)
Dept: PHYSICAL THERAPY | Facility: CLINIC | Age: 60
End: 2025-03-19
Payer: COMMERCIAL

## 2025-03-26 ENCOUNTER — APPOINTMENT (OUTPATIENT)
Dept: PHYSICAL THERAPY | Facility: CLINIC | Age: 60
End: 2025-03-26
Payer: COMMERCIAL

## 2025-04-09 ENCOUNTER — APPOINTMENT (OUTPATIENT)
Dept: PHYSICAL THERAPY | Facility: CLINIC | Age: 60
End: 2025-04-09
Payer: COMMERCIAL

## 2025-04-16 ENCOUNTER — APPOINTMENT (OUTPATIENT)
Dept: PHYSICAL THERAPY | Facility: CLINIC | Age: 60
End: 2025-04-16
Attending: EMERGENCY MEDICINE
Payer: COMMERCIAL

## 2025-04-23 ENCOUNTER — EVALUATION (OUTPATIENT)
Dept: PHYSICAL THERAPY | Facility: CLINIC | Age: 60
End: 2025-04-23
Attending: EMERGENCY MEDICINE
Payer: COMMERCIAL

## 2025-04-23 DIAGNOSIS — G89.29 CHRONIC PAIN OF LEFT KNEE: Primary | ICD-10-CM

## 2025-04-23 DIAGNOSIS — M25.562 CHRONIC PAIN OF LEFT KNEE: Primary | ICD-10-CM

## 2025-04-23 PROCEDURE — 97110 THERAPEUTIC EXERCISES: CPT | Performed by: PHYSICAL THERAPIST

## 2025-04-23 PROCEDURE — 97140 MANUAL THERAPY 1/> REGIONS: CPT | Performed by: PHYSICAL THERAPIST

## 2025-04-23 NOTE — PROGRESS NOTES
PT Re-Evaluation     Today's date: 2025  Patient name: Apple King  : 1965  MRN: [id number]  Referring provider: Joey Matamoros MD  Dx:   Encounter Diagnosis     ICD-10-CM    1. Chronic pain of left knee  M25.562     G89.29                        Assessment  Impairments: abnormal gait, abnormal muscle tone, abnormal or restricted ROM, abnormal movement, activity intolerance, impaired balance, impaired physical strength, lacks appropriate home exercise program, pain with function and weight-bearing intolerance    Assessment details: Patient is a 60 y.o. year old female who attended physical therapy for 5 treatment sessions regarding chronic left knee pain . Patient reports moderate improvement at this time which correlates to improved FOTO scoring.  Patient has shown improvement throughout PT by demonstrating decreased pain, increased range of motion, increased strength, improved tolerance to activity, and improved gait/balance.  Patient continues to present with pain, decreased ROM, decreased strength, and decreased tolerance to activity. Apple would benefit from continued physical therapy to address these issues and to maximize function. Thank you.      Understanding of Dx/Px/POC: excellent     Prognosis: excellent    Goals  STG (2 weeks)  1. Patient will be independent with HEP (MET)  2. Decrease pain at worst by 2 points on NPRS  (MET)  3. Patient will demonstrate ability to navigate stairs as needed without pain (PROGRESSING)  LTG (4 weeks)  1. Decrease pain at worst from 4 points on NPRS (PROGRESSING)  2. Increase gluteus medius strength by 1/2 MMT grade B/L to decrease knee valgus stress and control with dynamic movement (PROGRESSING)  3. Patient will demonstrate ability to resume pickle ball with adequate knee stability and minimal to no discomfort (PROGRESSING)  4. Increase FOTO score > or equal to expected outcome (PROGRESSING)    Plan  Patient would benefit from: skilled PT    Planned  therapy interventions: joint mobilization, manual therapy, neuromuscular re-education, patient education, strengthening, stretching, therapeutic exercise, home exercise program, ADL training and balance    Frequency: 1x week  Duration in weeks: 8  Treatment plan discussed with: patient      Subjective Evaluation    History of Present Illness  Mechanism of injury: Patient reports to outpatient PT secondary to chronic left knee pain.  Patient states that the pain began 1 year prior while on a cruise performing a lot of steps and describes the pain as achy and sharp which is worse with prolonged positions including transitions and stair navigation, and walking down hills and improved with rest.  Notes active plantar fasciitis of the right foot which may be effecting the left.  MRI was significant for medial meniscus pathology and medial joint OA.  Patient noted relief from a CSI 1 year prior but minimal relief from recent CSI.  Patient is retired but notes difficulty with ADL's and leisure functions as a result.  Patients goals for PT are to decrease the pain and return to PLOF including pickle ball.     2025: Patient notes a mixed bag over the past few weeks with periods of time allowing greater activity and other periods with increased symptoms.  Notes that her back may be limiting her mobility more right now which can be effecting the knee.            Recurrent probem    Quality of life: good    Patient Goals  Patient goals for therapy: increased strength, independence with ADLs/IADLs, return to sport/leisure activities, increased motion and decreased pain    Pain  Current pain ratin  At best pain ratin  At worst pain ratin  Quality: dull ache, tight and sharp  Relieving factors: rest and relaxation  Aggravating factors: stair climbing  Progression: worsening    Social Support  Stairs in house: yes   Lives in: multiple-level home    Employment status: not working    Diagnostic Tests  MRI studies:  "abnormal (1. Undersurface posterior horn medial meniscus tear without flipped fragment. 2. Degenerative change including full-thickness medial compartment cartilage loss.)  Treatments  Previous treatment: injection treatment  Current treatment: injection treatment and medication        Objective     Tenderness   Left Knee   Tenderness in the medial joint line. No tenderness in the lateral joint line and patellar tendon.     Active Range of Motion   Left Knee   Flexion: 128 degrees with pain  Extension: 0 degrees     Passive Range of Motion   Left Knee   Flexion: 130 degrees with pain  Extension: 0 degrees     Mobility   Patellar Mobility:   Left Knee   Hypomobile: left medial, left lateral, left superior and left inferior    Strength/Myotome Testing     Left Knee   Flexion: 4  Extension: 4-  Quadriceps contraction: good    Right Knee   Flexion: 4+  Extension: 4+  Quadriceps contraction: good    Additional Strength Details  Gluteus medius:    R = 3+/5 (4/23/2025: 4-/5)  L = 3-/5 (4/23/2025: 3+/5)    Tests     Left Knee   Positive Apley's compression and medial Jenny.   Negative lateral Jenny.       Flowsheet Rows      Flowsheet Row Most Recent Value   PT/OT G-Codes    Current Score 47   Projected Score 66               Precautions:   Patient Active Problem List   Diagnosis    Hypertension    Vaginal candidiasis    Acquired hypothyroidism    Mixed hyperlipidemia    ALEKSEY (obstructive sleep apnea)         Manuals 2/19 2/28 3/5 3/12 4/23        Re-assessment     15'                                               Neuro Re-Ed                                                                                                        Ther Ex             Recumbent bike warm up  L2 5' L2 5' L2 5' L2 5'        Seated HS stretch 3x20\" 3x20\" 3x20\" stairs          Standing hip abduction - pink TB at feet 3x10 B/L 3x10 B/L  3x10 B/L         Supine abduction bridge -purple TB 3x10 3x10  3x10         Side clamshells - purple TB HEP " "3x10 B/L  3x10 B/L 3x10 B/L x2\"        Squat isometric in front of chair 2 sets 3 sets 3 sets 3 sets         Side steps with band at feet - purple band  X2 sets  X2 sets         Leg Press  105# 3x10 115# 3x10 115# 3x10         Seated HS curl - green TB   3x10          Side hip abduction     2x10 B/L        LAQ isometric     2x10  x5\"        Ther Activity                                       Gait Training                                       Modalities                                              "

## 2025-05-07 ENCOUNTER — OFFICE VISIT (OUTPATIENT)
Dept: PHYSICAL THERAPY | Facility: CLINIC | Age: 60
End: 2025-05-07
Attending: EMERGENCY MEDICINE
Payer: COMMERCIAL

## 2025-05-07 DIAGNOSIS — G89.29 CHRONIC PAIN OF LEFT KNEE: Primary | ICD-10-CM

## 2025-05-07 DIAGNOSIS — M25.562 CHRONIC PAIN OF LEFT KNEE: Primary | ICD-10-CM

## 2025-05-07 PROCEDURE — 97110 THERAPEUTIC EXERCISES: CPT | Performed by: PHYSICAL THERAPIST

## 2025-05-07 NOTE — PROGRESS NOTES
"Daily Note     Today's date: 2025  Patient name: Apple King  : 1965  MRN: [id number]  Referring provider: Joey Matamoros MD  Dx:   Encounter Diagnosis     ICD-10-CM    1. Chronic pain of left knee  M25.562     G89.29                      Subjective: Patient reports back pain limiting function today which in turn is aggravating her knee.  Notes that overall her knee pain has been fluctuating without significant improvement.       Objective: See treatment diary below      Assessment: Modified treatment session to limit lumbar strain.  Excellent tolerance to proximal LE strengthening with significant improvement in gluteus medius activation and control.  Updated HEP to include stretching of musculature crossing the knee joint.       Plan: Continue per plan of care.      Precautions:   Patient Active Problem List   Diagnosis    Hypertension    Vaginal candidiasis    Acquired hypothyroidism    Mixed hyperlipidemia    ALEKSEY (obstructive sleep apnea)         Manuals 2/19 2/28 3/5 3/12 4/23 5/7       Re-assessment     15'                                               Neuro Re-Ed                                                                                                        Ther Ex             Recumbent bike warm up  L2 5' L2 5' L2 5' L2 5' L2 5'       Seated HS stretch 3x20\" 3x20\" 3x20\" stairs          Standing hip abduction - pink TB at feet 3x10 B/L 3x10 B/L  3x10 B/L         Supine abduction bridge -purple TB 3x10 3x10  3x10         Side clamshells - purple TB HEP 3x10 B/L  3x10 B/L 3x10 B/L x2\" 3x10 B/L       Squat isometric in front of chair 2 sets 3 sets 3 sets 3 sets         Side steps with band at feet - purple band  X2 sets  X2 sets         Leg Press  105# 3x10 115# 3x10 115# 3x10  125# 3x10       Seated HS curl - green TB   3x10          Side hip abduction     2x10 B/L 2x10 B/L       Standing gastroc stretch      3x20\"       Supine quad stretch      3x20\"       LAQ isometric     2x10  x5\" " "2x10 x5\"       Ther Activity                                       Gait Training                                       Modalities                                              "

## 2025-05-21 ENCOUNTER — APPOINTMENT (OUTPATIENT)
Dept: PHYSICAL THERAPY | Facility: CLINIC | Age: 60
End: 2025-05-21
Attending: EMERGENCY MEDICINE
Payer: COMMERCIAL

## 2025-05-31 DIAGNOSIS — M72.2 PLANTAR FASCIITIS: ICD-10-CM

## 2025-06-09 DIAGNOSIS — E03.9 ACQUIRED HYPOTHYROIDISM: ICD-10-CM

## 2025-06-09 RX ORDER — LEVOTHYROXINE SODIUM 100 MCG
100 TABLET ORAL EVERY OTHER DAY
Qty: 45 TABLET | Refills: 1 | Status: SHIPPED | OUTPATIENT
Start: 2025-06-09 | End: 2025-12-06

## 2025-07-25 ENCOUNTER — APPOINTMENT (OUTPATIENT)
Dept: LAB | Facility: CLINIC | Age: 60
End: 2025-07-25
Payer: COMMERCIAL

## 2025-07-25 DIAGNOSIS — E78.2 MIXED HYPERLIPIDEMIA: ICD-10-CM

## 2025-07-25 DIAGNOSIS — Z13.0 SCREENING FOR DEFICIENCY ANEMIA: ICD-10-CM

## 2025-07-25 LAB
ALBUMIN SERPL BCG-MCNC: 4.5 G/DL (ref 3.5–5)
ALP SERPL-CCNC: 53 U/L (ref 34–104)
ALT SERPL W P-5'-P-CCNC: 20 U/L (ref 7–52)
ANION GAP SERPL CALCULATED.3IONS-SCNC: 6 MMOL/L (ref 4–13)
AST SERPL W P-5'-P-CCNC: 23 U/L (ref 13–39)
BASOPHILS # BLD AUTO: 0.05 THOUSANDS/ÂΜL (ref 0–0.1)
BASOPHILS NFR BLD AUTO: 1 % (ref 0–1)
BILIRUB SERPL-MCNC: 0.56 MG/DL (ref 0.2–1)
BUN SERPL-MCNC: 13 MG/DL (ref 5–25)
CALCIUM SERPL-MCNC: 10 MG/DL (ref 8.4–10.2)
CHLORIDE SERPL-SCNC: 104 MMOL/L (ref 96–108)
CHOLEST SERPL-MCNC: 144 MG/DL (ref ?–200)
CO2 SERPL-SCNC: 29 MMOL/L (ref 21–32)
CREAT SERPL-MCNC: 0.81 MG/DL (ref 0.6–1.3)
EOSINOPHIL # BLD AUTO: 0.21 THOUSAND/ÂΜL (ref 0–0.61)
EOSINOPHIL NFR BLD AUTO: 3 % (ref 0–6)
ERYTHROCYTE [DISTWIDTH] IN BLOOD BY AUTOMATED COUNT: 12.8 % (ref 11.6–15.1)
GFR SERPL CREATININE-BSD FRML MDRD: 78 ML/MIN/1.73SQ M
GLUCOSE P FAST SERPL-MCNC: 86 MG/DL (ref 65–99)
HCT VFR BLD AUTO: 41.6 % (ref 34.8–46.1)
HDLC SERPL-MCNC: 63 MG/DL
HGB BLD-MCNC: 13.6 G/DL (ref 11.5–15.4)
IMM GRANULOCYTES # BLD AUTO: 0.02 THOUSAND/UL (ref 0–0.2)
IMM GRANULOCYTES NFR BLD AUTO: 0 % (ref 0–2)
LDLC SERPL CALC-MCNC: 66 MG/DL (ref 0–100)
LYMPHOCYTES # BLD AUTO: 2.19 THOUSANDS/ÂΜL (ref 0.6–4.47)
LYMPHOCYTES NFR BLD AUTO: 35 % (ref 14–44)
MCH RBC QN AUTO: 29.6 PG (ref 26.8–34.3)
MCHC RBC AUTO-ENTMCNC: 32.7 G/DL (ref 31.4–37.4)
MCV RBC AUTO: 91 FL (ref 82–98)
MONOCYTES # BLD AUTO: 0.65 THOUSAND/ÂΜL (ref 0.17–1.22)
MONOCYTES NFR BLD AUTO: 10 % (ref 4–12)
NEUTROPHILS # BLD AUTO: 3.13 THOUSANDS/ÂΜL (ref 1.85–7.62)
NEUTS SEG NFR BLD AUTO: 51 % (ref 43–75)
NRBC BLD AUTO-RTO: 0 /100 WBCS
PLATELET # BLD AUTO: 255 THOUSANDS/UL (ref 149–390)
PMV BLD AUTO: 11.6 FL (ref 8.9–12.7)
POTASSIUM SERPL-SCNC: 4.3 MMOL/L (ref 3.5–5.3)
PROT SERPL-MCNC: 7.4 G/DL (ref 6.4–8.4)
RBC # BLD AUTO: 4.59 MILLION/UL (ref 3.81–5.12)
SODIUM SERPL-SCNC: 139 MMOL/L (ref 135–147)
TRIGL SERPL-MCNC: 77 MG/DL (ref ?–150)
TSH SERPL DL<=0.05 MIU/L-ACNC: 0.75 UIU/ML (ref 0.45–4.5)
WBC # BLD AUTO: 6.25 THOUSAND/UL (ref 4.31–10.16)

## 2025-07-25 PROCEDURE — 36415 COLL VENOUS BLD VENIPUNCTURE: CPT

## 2025-07-25 PROCEDURE — 80061 LIPID PANEL: CPT

## 2025-07-25 PROCEDURE — 84443 ASSAY THYROID STIM HORMONE: CPT

## 2025-07-25 PROCEDURE — 80053 COMPREHEN METABOLIC PANEL: CPT

## 2025-07-25 PROCEDURE — 85025 COMPLETE CBC W/AUTO DIFF WBC: CPT

## 2025-07-26 DIAGNOSIS — E78.2 MIXED HYPERLIPIDEMIA: ICD-10-CM

## 2025-07-28 RX ORDER — ROSUVASTATIN CALCIUM 10 MG/1
10 TABLET, COATED ORAL DAILY
Qty: 90 TABLET | Refills: 1 | Status: SHIPPED | OUTPATIENT
Start: 2025-07-28

## 2025-07-30 ENCOUNTER — OFFICE VISIT (OUTPATIENT)
Dept: FAMILY MEDICINE CLINIC | Facility: CLINIC | Age: 60
End: 2025-07-30
Payer: COMMERCIAL

## 2025-07-30 VITALS
BODY MASS INDEX: 30.73 KG/M2 | TEMPERATURE: 97.1 F | HEART RATE: 84 BPM | SYSTOLIC BLOOD PRESSURE: 128 MMHG | HEIGHT: 62 IN | DIASTOLIC BLOOD PRESSURE: 84 MMHG | WEIGHT: 167 LBS | OXYGEN SATURATION: 99 %

## 2025-07-30 DIAGNOSIS — Z00.00 ANNUAL PHYSICAL EXAM: Primary | ICD-10-CM

## 2025-07-30 DIAGNOSIS — E03.9 ACQUIRED HYPOTHYROIDISM: ICD-10-CM

## 2025-07-30 DIAGNOSIS — Z12.31 ENCOUNTER FOR SCREENING MAMMOGRAM FOR BREAST CANCER: ICD-10-CM

## 2025-07-30 DIAGNOSIS — I10 HYPERTENSION, UNSPECIFIED TYPE: ICD-10-CM

## 2025-07-30 DIAGNOSIS — G47.33 OSA (OBSTRUCTIVE SLEEP APNEA): ICD-10-CM

## 2025-07-30 DIAGNOSIS — E78.2 MIXED HYPERLIPIDEMIA: ICD-10-CM

## 2025-07-30 PROCEDURE — 99396 PREV VISIT EST AGE 40-64: CPT | Performed by: FAMILY MEDICINE

## 2025-07-30 RX ORDER — MELOXICAM 15 MG/1
15 TABLET ORAL DAILY
Qty: 60 TABLET | Refills: 2 | OUTPATIENT
Start: 2025-07-30

## 2025-07-30 RX ORDER — IMIQUIMOD 12.5 MG/.25G
CREAM TOPICAL
COMMUNITY
Start: 2025-07-11